# Patient Record
Sex: FEMALE | Race: WHITE | NOT HISPANIC OR LATINO
[De-identification: names, ages, dates, MRNs, and addresses within clinical notes are randomized per-mention and may not be internally consistent; named-entity substitution may affect disease eponyms.]

---

## 2019-06-06 ENCOUNTER — RESULT REVIEW (OUTPATIENT)
Age: 39
End: 2019-06-06

## 2020-01-15 PROBLEM — Z00.00 ENCOUNTER FOR PREVENTIVE HEALTH EXAMINATION: Status: ACTIVE | Noted: 2020-01-15

## 2020-01-17 ENCOUNTER — APPOINTMENT (OUTPATIENT)
Dept: ANTEPARTUM | Facility: CLINIC | Age: 40
End: 2020-01-17
Payer: COMMERCIAL

## 2020-01-17 ENCOUNTER — ASOB RESULT (OUTPATIENT)
Age: 40
End: 2020-01-17

## 2020-01-17 PROCEDURE — 76817 TRANSVAGINAL US OBSTETRIC: CPT

## 2020-01-17 PROCEDURE — 99241 OFFICE CONSULTATION NEW/ESTAB PATIENT 15 MIN: CPT | Mod: 25

## 2020-01-17 PROCEDURE — 76811 OB US DETAILED SNGL FETUS: CPT

## 2020-06-09 ENCOUNTER — TRANSCRIPTION ENCOUNTER (OUTPATIENT)
Age: 40
End: 2020-06-09

## 2020-06-10 ENCOUNTER — INPATIENT (INPATIENT)
Facility: HOSPITAL | Age: 40
LOS: 1 days | Discharge: HOME | End: 2020-06-12
Attending: OBSTETRICS & GYNECOLOGY | Admitting: OBSTETRICS & GYNECOLOGY
Payer: COMMERCIAL

## 2020-06-10 VITALS
RESPIRATION RATE: 15 BRPM | HEART RATE: 80 BPM | DIASTOLIC BLOOD PRESSURE: 62 MMHG | SYSTOLIC BLOOD PRESSURE: 118 MMHG | OXYGEN SATURATION: 99 %

## 2020-06-10 DIAGNOSIS — Z3A.00 WEEKS OF GESTATION OF PREGNANCY NOT SPECIFIED: ICD-10-CM

## 2020-06-10 DIAGNOSIS — O26.899 OTHER SPECIFIED PREGNANCY RELATED CONDITIONS, UNSPECIFIED TRIMESTER: ICD-10-CM

## 2020-06-10 DIAGNOSIS — Z34.80 ENCOUNTER FOR SUPERVISION OF OTHER NORMAL PREGNANCY, UNSPECIFIED TRIMESTER: ICD-10-CM

## 2020-06-10 LAB
BASOPHILS # BLD AUTO: 0.03 K/UL — SIGNIFICANT CHANGE UP (ref 0–0.2)
BASOPHILS NFR BLD AUTO: 0.2 % — SIGNIFICANT CHANGE UP (ref 0–2)
BLD GP AB SCN SERPL QL: NEGATIVE — SIGNIFICANT CHANGE UP
EOSINOPHIL # BLD AUTO: 0.01 K/UL — SIGNIFICANT CHANGE UP (ref 0–0.5)
EOSINOPHIL NFR BLD AUTO: 0.1 % — SIGNIFICANT CHANGE UP (ref 0–6)
HCT VFR BLD CALC: 36.5 % — SIGNIFICANT CHANGE UP (ref 34.5–45)
HGB BLD-MCNC: 12.4 G/DL — SIGNIFICANT CHANGE UP (ref 11.5–15.5)
IMM GRANULOCYTES NFR BLD AUTO: 0.4 % — SIGNIFICANT CHANGE UP (ref 0–1.5)
LYMPHOCYTES # BLD AUTO: 0.73 K/UL — LOW (ref 1–3.3)
LYMPHOCYTES # BLD AUTO: 6 % — LOW (ref 13–44)
MCHC RBC-ENTMCNC: 32.4 PG — SIGNIFICANT CHANGE UP (ref 27–34)
MCHC RBC-ENTMCNC: 34 GM/DL — SIGNIFICANT CHANGE UP (ref 32–36)
MCV RBC AUTO: 95.3 FL — SIGNIFICANT CHANGE UP (ref 80–100)
MONOCYTES # BLD AUTO: 0.61 K/UL — SIGNIFICANT CHANGE UP (ref 0–0.9)
MONOCYTES NFR BLD AUTO: 5 % — SIGNIFICANT CHANGE UP (ref 2–14)
NEUTROPHILS # BLD AUTO: 10.81 K/UL — HIGH (ref 1.8–7.4)
NEUTROPHILS NFR BLD AUTO: 88.3 % — HIGH (ref 43–77)
NRBC # BLD: 0 /100 WBCS — SIGNIFICANT CHANGE UP (ref 0–0)
PLATELET # BLD AUTO: 217 K/UL — SIGNIFICANT CHANGE UP (ref 150–400)
RBC # BLD: 3.83 M/UL — SIGNIFICANT CHANGE UP (ref 3.8–5.2)
RBC # FLD: 12.8 % — SIGNIFICANT CHANGE UP (ref 10.3–14.5)
RH IG SCN BLD-IMP: POSITIVE — SIGNIFICANT CHANGE UP
RH IG SCN BLD-IMP: POSITIVE — SIGNIFICANT CHANGE UP
SARS-COV-2 RNA SPEC QL NAA+PROBE: SIGNIFICANT CHANGE UP
T PALLIDUM AB TITR SER: NEGATIVE — SIGNIFICANT CHANGE UP
WBC # BLD: 12.24 K/UL — HIGH (ref 3.8–10.5)
WBC # FLD AUTO: 12.24 K/UL — HIGH (ref 3.8–10.5)

## 2020-06-10 RX ORDER — LANOLIN
1 OINTMENT (GRAM) TOPICAL EVERY 6 HOURS
Refills: 0 | Status: DISCONTINUED | OUTPATIENT
Start: 2020-06-10 | End: 2020-06-12

## 2020-06-10 RX ORDER — SODIUM CHLORIDE 9 MG/ML
1000 INJECTION, SOLUTION INTRAVENOUS
Refills: 0 | Status: DISCONTINUED | OUTPATIENT
Start: 2020-06-10 | End: 2020-06-10

## 2020-06-10 RX ORDER — OXYTOCIN 10 UNIT/ML
333.33 VIAL (ML) INJECTION
Qty: 20 | Refills: 0 | Status: DISCONTINUED | OUTPATIENT
Start: 2020-06-10 | End: 2020-06-12

## 2020-06-10 RX ORDER — OXYTOCIN 10 UNIT/ML
4 VIAL (ML) INJECTION
Qty: 30 | Refills: 0 | Status: DISCONTINUED | OUTPATIENT
Start: 2020-06-10 | End: 2020-06-12

## 2020-06-10 RX ORDER — MORPHINE SULFATE 50 MG/1
1.5 CAPSULE, EXTENDED RELEASE ORAL ONCE
Refills: 0 | Status: DISCONTINUED | OUTPATIENT
Start: 2020-06-10 | End: 2020-06-11

## 2020-06-10 RX ORDER — OXYCODONE HYDROCHLORIDE 5 MG/1
5 TABLET ORAL
Refills: 0 | Status: DISCONTINUED | OUTPATIENT
Start: 2020-06-10 | End: 2020-06-12

## 2020-06-10 RX ORDER — DIPHENHYDRAMINE HCL 50 MG
25 CAPSULE ORAL EVERY 6 HOURS
Refills: 0 | Status: DISCONTINUED | OUTPATIENT
Start: 2020-06-10 | End: 2020-06-12

## 2020-06-10 RX ORDER — SODIUM CHLORIDE 9 MG/ML
1000 INJECTION, SOLUTION INTRAVENOUS
Refills: 0 | Status: DISCONTINUED | OUTPATIENT
Start: 2020-06-10 | End: 2020-06-12

## 2020-06-10 RX ORDER — OXYCODONE HYDROCHLORIDE 5 MG/1
5 TABLET ORAL ONCE
Refills: 0 | Status: DISCONTINUED | OUTPATIENT
Start: 2020-06-10 | End: 2020-06-12

## 2020-06-10 RX ORDER — MAGNESIUM HYDROXIDE 400 MG/1
30 TABLET, CHEWABLE ORAL
Refills: 0 | Status: DISCONTINUED | OUTPATIENT
Start: 2020-06-10 | End: 2020-06-12

## 2020-06-10 RX ORDER — IBUPROFEN 200 MG
600 TABLET ORAL EVERY 6 HOURS
Refills: 0 | Status: COMPLETED | OUTPATIENT
Start: 2020-06-10 | End: 2021-05-09

## 2020-06-10 RX ORDER — TETANUS TOXOID, REDUCED DIPHTHERIA TOXOID AND ACELLULAR PERTUSSIS VACCINE, ADSORBED 5; 2.5; 8; 8; 2.5 [IU]/.5ML; [IU]/.5ML; UG/.5ML; UG/.5ML; UG/.5ML
0.5 SUSPENSION INTRAMUSCULAR ONCE
Refills: 0 | Status: DISCONTINUED | OUTPATIENT
Start: 2020-06-10 | End: 2020-06-12

## 2020-06-10 RX ORDER — CITRIC ACID/SODIUM CITRATE 300-500 MG
15 SOLUTION, ORAL ORAL EVERY 6 HOURS
Refills: 0 | Status: DISCONTINUED | OUTPATIENT
Start: 2020-06-10 | End: 2020-06-10

## 2020-06-10 RX ORDER — ACETAMINOPHEN 500 MG
1000 TABLET ORAL ONCE
Refills: 0 | Status: COMPLETED | OUTPATIENT
Start: 2020-06-10 | End: 2020-06-10

## 2020-06-10 RX ORDER — KETOROLAC TROMETHAMINE 30 MG/ML
30 SYRINGE (ML) INJECTION EVERY 6 HOURS
Refills: 0 | Status: DISCONTINUED | OUTPATIENT
Start: 2020-06-10 | End: 2020-06-11

## 2020-06-10 RX ORDER — ACETAMINOPHEN 500 MG
975 TABLET ORAL
Refills: 0 | Status: DISCONTINUED | OUTPATIENT
Start: 2020-06-10 | End: 2020-06-12

## 2020-06-10 RX ORDER — SIMETHICONE 80 MG/1
80 TABLET, CHEWABLE ORAL EVERY 4 HOURS
Refills: 0 | Status: DISCONTINUED | OUTPATIENT
Start: 2020-06-10 | End: 2020-06-12

## 2020-06-10 RX ORDER — HEPARIN SODIUM 5000 [USP'U]/ML
5000 INJECTION INTRAVENOUS; SUBCUTANEOUS EVERY 12 HOURS
Refills: 0 | Status: DISCONTINUED | OUTPATIENT
Start: 2020-06-10 | End: 2020-06-12

## 2020-06-10 RX ORDER — OXYCODONE HYDROCHLORIDE 5 MG/1
5 TABLET ORAL
Refills: 0 | Status: DISCONTINUED | OUTPATIENT
Start: 2020-06-10 | End: 2020-06-11

## 2020-06-10 RX ADMIN — Medication 400 MILLIGRAM(S): at 19:04

## 2020-06-10 RX ADMIN — Medication 15 MILLILITER(S): at 15:59

## 2020-06-10 RX ADMIN — Medication 4 MILLIUNIT(S)/MIN: at 11:55

## 2020-06-10 RX ADMIN — SODIUM CHLORIDE 125 MILLILITER(S): 9 INJECTION, SOLUTION INTRAVENOUS at 15:19

## 2020-06-10 RX ADMIN — Medication 30 MILLIGRAM(S): at 17:47

## 2020-06-10 RX ADMIN — Medication 1000 MILLIUNIT(S)/MIN: at 17:00

## 2020-06-10 NOTE — PRE-ANESTHESIA EVALUATION ADULT - NSANTHPMHFT_GEN_ALL_CORE
39F primigravid without significant PMHx. Allergies to penicillin, ceclor, biaxin. Denies Hx back problems, bleeding disorders.

## 2020-06-11 LAB
BASOPHILS # BLD AUTO: 0.04 K/UL — SIGNIFICANT CHANGE UP (ref 0–0.2)
BASOPHILS NFR BLD AUTO: 0.3 % — SIGNIFICANT CHANGE UP (ref 0–2)
EOSINOPHIL # BLD AUTO: 0.01 K/UL — SIGNIFICANT CHANGE UP (ref 0–0.5)
EOSINOPHIL NFR BLD AUTO: 0.1 % — SIGNIFICANT CHANGE UP (ref 0–6)
HCT VFR BLD CALC: 26.1 % — LOW (ref 34.5–45)
HGB BLD-MCNC: 8.7 G/DL — LOW (ref 11.5–15.5)
IMM GRANULOCYTES NFR BLD AUTO: 0.6 % — SIGNIFICANT CHANGE UP (ref 0–1.5)
LYMPHOCYTES # BLD AUTO: 0.92 K/UL — LOW (ref 1–3.3)
LYMPHOCYTES # BLD AUTO: 5.9 % — LOW (ref 13–44)
MCHC RBC-ENTMCNC: 32.8 PG — SIGNIFICANT CHANGE UP (ref 27–34)
MCHC RBC-ENTMCNC: 33.3 GM/DL — SIGNIFICANT CHANGE UP (ref 32–36)
MCV RBC AUTO: 98.5 FL — SIGNIFICANT CHANGE UP (ref 80–100)
MONOCYTES # BLD AUTO: 0.94 K/UL — HIGH (ref 0–0.9)
MONOCYTES NFR BLD AUTO: 6.1 % — SIGNIFICANT CHANGE UP (ref 2–14)
NEUTROPHILS # BLD AUTO: 13.5 K/UL — HIGH (ref 1.8–7.4)
NEUTROPHILS NFR BLD AUTO: 87 % — HIGH (ref 43–77)
NRBC # BLD: 0 /100 WBCS — SIGNIFICANT CHANGE UP (ref 0–0)
PLATELET # BLD AUTO: 186 K/UL — SIGNIFICANT CHANGE UP (ref 150–400)
RBC # BLD: 2.65 M/UL — LOW (ref 3.8–5.2)
RBC # FLD: 13 % — SIGNIFICANT CHANGE UP (ref 10.3–14.5)
WBC # BLD: 15.51 K/UL — HIGH (ref 3.8–10.5)
WBC # FLD AUTO: 15.51 K/UL — HIGH (ref 3.8–10.5)

## 2020-06-11 RX ORDER — FERROUS SULFATE 325(65) MG
325 TABLET ORAL
Refills: 0 | Status: DISCONTINUED | OUTPATIENT
Start: 2020-06-11 | End: 2020-06-12

## 2020-06-11 RX ORDER — SENNA PLUS 8.6 MG/1
1 TABLET ORAL DAILY
Refills: 0 | Status: DISCONTINUED | OUTPATIENT
Start: 2020-06-11 | End: 2020-06-12

## 2020-06-11 RX ORDER — IBUPROFEN 200 MG
600 TABLET ORAL EVERY 6 HOURS
Refills: 0 | Status: DISCONTINUED | OUTPATIENT
Start: 2020-06-11 | End: 2020-06-12

## 2020-06-11 RX ORDER — ASCORBIC ACID 60 MG
500 TABLET,CHEWABLE ORAL DAILY
Refills: 0 | Status: DISCONTINUED | OUTPATIENT
Start: 2020-06-11 | End: 2020-06-12

## 2020-06-11 RX ADMIN — HEPARIN SODIUM 5000 UNIT(S): 5000 INJECTION INTRAVENOUS; SUBCUTANEOUS at 11:49

## 2020-06-11 RX ADMIN — Medication 975 MILLIGRAM(S): at 21:36

## 2020-06-11 RX ADMIN — HEPARIN SODIUM 5000 UNIT(S): 5000 INJECTION INTRAVENOUS; SUBCUTANEOUS at 00:32

## 2020-06-11 RX ADMIN — Medication 500 MILLIGRAM(S): at 11:48

## 2020-06-11 RX ADMIN — Medication 30 MILLIGRAM(S): at 00:31

## 2020-06-11 RX ADMIN — Medication 30 MILLIGRAM(S): at 11:49

## 2020-06-11 RX ADMIN — Medication 600 MILLIGRAM(S): at 17:55

## 2020-06-11 RX ADMIN — Medication 975 MILLIGRAM(S): at 02:46

## 2020-06-11 RX ADMIN — Medication 975 MILLIGRAM(S): at 14:31

## 2020-06-11 RX ADMIN — Medication 30 MILLIGRAM(S): at 05:53

## 2020-06-11 RX ADMIN — SENNA PLUS 1 TABLET(S): 8.6 TABLET ORAL at 14:31

## 2020-06-11 RX ADMIN — Medication 325 MILLIGRAM(S): at 17:50

## 2020-06-11 RX ADMIN — Medication 975 MILLIGRAM(S): at 08:01

## 2020-06-11 NOTE — CHART NOTE - NSCHARTNOTEFT_GEN_A_CORE
PA Anemia NOTE     POD#1      Vital Signs Last 24 Hrs  T(C): 36.4 (2020 06:31), Max: 37.6 (10 Raoul 2020 19:55)  T(F): 97.6 (2020 06:31), Max: 99.7 (10 Raoul 2020 19:55)  HR: 60 (2020 06:31) (60 - 102)  BP: 109/72 (2020 06:31) (103/56 - 118/62)  BP(mean): 78 (10 Raoul 2020 19:55) (72 - 85)  RR: 18 (2020 06:31) (14 - 22)  SpO2: 98% (2020 06:31) (96% - 99%)               8.7    15.51 )-----------( 186      ( 11 @ 06:36 )             26.1                12.4   12.24 )-----------( 217      ( 10 @ 03:19 )             36.5     Assessment:  39 y.o. S/P   C/S with anemia due to acute blood loss-VSS/Asx-not requiring blood tranfusion for Iron Supplementation  Plan:  - Ferrous Sulfate, Colace, Vitamin C supplementation.  - Monitor for signs/symptoms of anemia.     MANJEET Burns

## 2020-06-11 NOTE — PROGRESS NOTE ADULT - ATTENDING COMMENTS
POD1 s/p 1'  section, doing well  -Routine postoperative care   -H/H appropriate   -Reg diet   -OLUI Peter DO

## 2020-06-12 ENCOUNTER — TRANSCRIPTION ENCOUNTER (OUTPATIENT)
Age: 40
End: 2020-06-12

## 2020-06-12 VITALS
HEART RATE: 58 BPM | OXYGEN SATURATION: 99 % | SYSTOLIC BLOOD PRESSURE: 113 MMHG | DIASTOLIC BLOOD PRESSURE: 76 MMHG | RESPIRATION RATE: 18 BRPM | TEMPERATURE: 98 F

## 2020-06-12 PROCEDURE — 86901 BLOOD TYPING SEROLOGIC RH(D): CPT

## 2020-06-12 PROCEDURE — 86780 TREPONEMA PALLIDUM: CPT

## 2020-06-12 PROCEDURE — 59050 FETAL MONITOR W/REPORT: CPT

## 2020-06-12 PROCEDURE — C1889: CPT

## 2020-06-12 PROCEDURE — 86900 BLOOD TYPING SEROLOGIC ABO: CPT

## 2020-06-12 PROCEDURE — 86850 RBC ANTIBODY SCREEN: CPT

## 2020-06-12 PROCEDURE — G0463: CPT

## 2020-06-12 PROCEDURE — 59025 FETAL NON-STRESS TEST: CPT

## 2020-06-12 PROCEDURE — 85027 COMPLETE CBC AUTOMATED: CPT

## 2020-06-12 RX ORDER — SENNA PLUS 8.6 MG/1
1 TABLET ORAL
Qty: 0 | Refills: 0 | DISCHARGE
Start: 2020-06-12

## 2020-06-12 RX ORDER — ACETAMINOPHEN 500 MG
3 TABLET ORAL
Qty: 0 | Refills: 0 | DISCHARGE
Start: 2020-06-12

## 2020-06-12 RX ORDER — IBUPROFEN 200 MG
1 TABLET ORAL
Qty: 0 | Refills: 0 | DISCHARGE
Start: 2020-06-12

## 2020-06-12 RX ORDER — FERROUS SULFATE 325(65) MG
1 TABLET ORAL
Qty: 0 | Refills: 0 | DISCHARGE
Start: 2020-06-12

## 2020-06-12 RX ORDER — ACETAMINOPHEN 500 MG
1 TABLET ORAL
Qty: 0 | Refills: 0 | DISCHARGE
Start: 2020-06-12

## 2020-06-12 RX ADMIN — Medication 975 MILLIGRAM(S): at 03:17

## 2020-06-12 RX ADMIN — SENNA PLUS 1 TABLET(S): 8.6 TABLET ORAL at 11:29

## 2020-06-12 RX ADMIN — HEPARIN SODIUM 5000 UNIT(S): 5000 INJECTION INTRAVENOUS; SUBCUTANEOUS at 00:44

## 2020-06-12 RX ADMIN — Medication 600 MILLIGRAM(S): at 00:44

## 2020-06-12 RX ADMIN — Medication 325 MILLIGRAM(S): at 05:55

## 2020-06-12 RX ADMIN — Medication 600 MILLIGRAM(S): at 11:29

## 2020-06-12 RX ADMIN — HEPARIN SODIUM 5000 UNIT(S): 5000 INJECTION INTRAVENOUS; SUBCUTANEOUS at 11:31

## 2020-06-12 RX ADMIN — Medication 975 MILLIGRAM(S): at 14:37

## 2020-06-12 RX ADMIN — Medication 600 MILLIGRAM(S): at 05:55

## 2020-06-12 RX ADMIN — Medication 500 MILLIGRAM(S): at 11:29

## 2020-06-12 RX ADMIN — Medication 975 MILLIGRAM(S): at 09:40

## 2020-06-12 NOTE — PROGRESS NOTE ADULT - ASSESSMENT
A/P: 40yo POD#2 s/p LTCS.  Patient is stable and doing well post-operatively.
A/P: 38yo POD#1 s/p LTCS. Patient is stable and doing well post-operatively.    - Continue regular diet.  - Increase ambulation.  - Continue motrin, tylenol, oxycodone PRN for pain control.   - AM CBC 12.4/36.5-->8.7/26.1, vital signs stable at this time, continue to monitor    Ken PGY1

## 2020-06-12 NOTE — DISCHARGE NOTE OB - HOSPITAL COURSE
Pt had a repeat  delivery at term. She had a female infant. She is anemic from surgical blood loss but asymptomatic. She will take oral iron

## 2020-06-12 NOTE — PROGRESS NOTE ADULT - SUBJECTIVE AND OBJECTIVE BOX
OB Progress Note: LTCS, POD#2    S: 38yo POD#2 s/p LTCS. Pain well controlled, tolerating regular diet, passing faltus, voiding spontaneously, ambulating without difficulty. Denies heavy vaginal bleeding, CP/SOB, lightheadedness/dizziness, nausea/vomiting,     O:  Vitals:  Vital Signs Last 24 Hrs  T(C): 36.6 (11 Jun 2020 23:00), Max: 37.1 (11 Jun 2020 13:15)  T(F): 97.9 (11 Jun 2020 23:00), Max: 98.7 (11 Jun 2020 13:15)  HR: 64 (11 Jun 2020 23:00) (59 - 64)  BP: 102/63 (11 Jun 2020 23:00) (98/61 - 117/71)  BP(mean): --  RR: 18 (11 Jun 2020 23:00) (16 - 18)  SpO2: 99% (11 Jun 2020 23:00) (98% - 100%)    MEDICATIONS  (STANDING):  acetaminophen   Tablet .. 975 milliGRAM(s) Oral <User Schedule>  ascorbic acid 500 milliGRAM(s) Oral daily  diphtheria/tetanus/pertussis (acellular) Vaccine (ADAcel) 0.5 milliLiter(s) IntraMuscular once  ferrous    sulfate 325 milliGRAM(s) Oral two times a day  heparin   Injectable 5000 Unit(s) SubCutaneous every 12 hours  ibuprofen  Tablet. 600 milliGRAM(s) Oral every 6 hours  lactated ringers. 1000 milliLiter(s) (125 mL/Hr) IV Continuous <Continuous>  oxytocin Infusion 333.333 milliUNIT(s)/Min (1000 mL/Hr) IV Continuous <Continuous>  oxytocin Infusion 333.333 milliUNIT(s)/Min (1000 mL/Hr) IV Continuous <Continuous>  oxytocin Infusion 4 milliUNIT(s)/Min (4 mL/Hr) IV Continuous <Continuous>  senna 1 Tablet(s) Oral daily      MEDICATIONS  (PRN):  diphenhydrAMINE 25 milliGRAM(s) Oral every 6 hours PRN Itching  lanolin Ointment 1 Application(s) Topical every 6 hours PRN Sore Nipples  magnesium hydroxide Suspension 30 milliLiter(s) Oral two times a day PRN Constipation  oxyCODONE    IR 5 milliGRAM(s) Oral every 3 hours PRN Moderate to Severe Pain (4-10)  oxyCODONE    IR 5 milliGRAM(s) Oral once PRN Moderate to Severe Pain (4-10)  simethicone 80 milliGRAM(s) Chew every 4 hours PRN Gas      Labs:  Blood type: O Positive  Rubella IgG: RPR: Negative                          8.7<L>   15.51<H> >-----------< 186    ( 06-11 @ 06:36 )             26.1<L>                        12.4   12.24<H> >-----------< 217    ( 06-10 @ 03:19 )             36.5                  PE:  General: NAD  Abdomen: Soft, appropriately tender, Fundus firm incision c/d/i.  VE: No heavy vaginal bleeding  Extremities: No erythema, no pitting edema
Day 1 of Anesthesia Pain Management Service    SUBJECTIVE:  Pain Scale Score:          [X] Refer to charted pain scores    THERAPY: Received PF Epidurall morphine as above    OBJECTIVE:    Sedation:        	[X] Alert	[ ] Drowsy	[ ] Arousable      [ ] Asleep       [ ] Unresponsive    Side Effects:	[X] None	[ ] Nausea	[ ] Vomiting         [ ] Pruritus  		[ ] Weakness            [ ] Numbness	          [ ] Other:    ASSESSMENT/ PLAN  [X] Patient transitioned to prn analgesics  [X] Pain management per primary service, pain service to sign off   [X]Documentation and Verification of current medications
Day 1 of Anesthesia Pain Management Service    SUBJECTIVE: Doing ok  Pain Scale Score:          [X] Refer to charted pain scores    THERAPY:  s/p   1.5  mg PF epidural morphine on 6/10/2020      MEDICATIONS  (STANDING):  acetaminophen   Tablet .. 975 milliGRAM(s) Oral <User Schedule>  ascorbic acid 500 milliGRAM(s) Oral daily  diphtheria/tetanus/pertussis (acellular) Vaccine (ADAcel) 0.5 milliLiter(s) IntraMuscular once  ferrous    sulfate 325 milliGRAM(s) Oral two times a day  heparin   Injectable 5000 Unit(s) SubCutaneous every 12 hours  ibuprofen  Tablet. 600 milliGRAM(s) Oral every 6 hours  ketorolac   Injectable 30 milliGRAM(s) IV Push every 6 hours  lactated ringers. 1000 milliLiter(s) (125 mL/Hr) IV Continuous <Continuous>  morphine PF Epidural 1.5 milliGRAM(s) Epidural once  oxytocin Infusion 333.333 milliUNIT(s)/Min (1000 mL/Hr) IV Continuous <Continuous>  oxytocin Infusion 333.333 milliUNIT(s)/Min (1000 mL/Hr) IV Continuous <Continuous>  oxytocin Infusion 4 milliUNIT(s)/Min (4 mL/Hr) IV Continuous <Continuous>  senna 1 Tablet(s) Oral daily    MEDICATIONS  (PRN):  diphenhydrAMINE 25 milliGRAM(s) Oral every 6 hours PRN Itching  lanolin Ointment 1 Application(s) Topical every 6 hours PRN Sore Nipples  magnesium hydroxide Suspension 30 milliLiter(s) Oral two times a day PRN Constipation  oxyCODONE    IR 5 milliGRAM(s) Oral every 3 hours PRN Mild Pain (1 - 3)  oxyCODONE    IR 5 milliGRAM(s) Oral every 3 hours PRN Moderate to Severe Pain (4-10)  oxyCODONE    IR 5 milliGRAM(s) Oral once PRN Moderate to Severe Pain (4-10)  simethicone 80 milliGRAM(s) Chew every 4 hours PRN Gas      OBJECTIVE:    Sedation:        	[X] Alert	 [ ] Drowsy	[ ] Arousable      [ ] Asleep       [ ] Unresponsive    Side Effects:	[X] None 	[ ] Nausea	[ ] Vomiting         [ ] Pruritus  		[ ] Weakness            [ ] Numbness	          [ ] Other:    Vital Signs Last 24 Hrs  T(C): 36.4 (11 Jun 2020 06:31), Max: 37.6 (10 Raoul 2020 19:55)  T(F): 97.6 (11 Jun 2020 06:31), Max: 99.7 (10 Raoul 2020 19:55)  HR: 60 (11 Jun 2020 06:31) (60 - 102)  BP: 109/72 (11 Jun 2020 06:31) (103/56 - 118/62)  BP(mean): 78 (10 Raoul 2020 19:55) (72 - 85)  RR: 18 (11 Jun 2020 06:31) (14 - 22)  SpO2: 98% (11 Jun 2020 06:31) (96% - 99%)    ASSESSMENT/ PLAN  [X] Patient transitioned to prn analgesics  [X] Pain management per primary service, pain service to sign off   [X]Documentation and Verification of current medications
OB Progress Note:  Delivery, POD#1    S: 38yo POD#1 s/p LTCS . Her pain is well controlled. She is tolerating a regular diet and passing flatus. Denies N/V. Denies CP/SOB/lightheadedness/dizziness.   She is ambulating without difficulty.   Voiding spontaneously.     O:   Vital Signs Last 24 Hrs  T(C): 36.4 (2020 06:31), Max: 37.6 (10 Raoul 2020 19:55)  T(F): 97.6 (2020 06:31), Max: 99.7 (10 Raoul 2020 19:55)  HR: 60 (2020 06:31) (60 - 102)  BP: 109/72 (2020 06:31) (103/56 - 118/62)  BP(mean): 78 (10 Raoul 2020 19:55) (72 - 85)  RR: 18 (2020 06:31) (14 - 22)  SpO2: 98% (2020 06:31) (96% - 99%)    Labs:  Blood type: O Positive  Rubella IgG: RPR: Negative                          8.7<L>   15.51<H> >-----------< 186    (  @ 06:36 )             26.1<L>                        12.4   12.24<H> >-----------< 217    ( 06-10 @ 03:19 )             36.5                  PE:  General: NAD  Abdomen: Mildly distended, appropriately tender, incision c/d/i.  Extremities: No erythema, no pitting edema
Postpartum Note,  Section   ATTENDING NOTE Post-operative day 2    Subjective:  The patient feels well.  She is ambulating.   She is tolerating regular diet.  She denies nausea and vomiting.  She is voiding.  Her pain is controlled.  She reports normal postpartum bleeding    Physical exam:    Vital Signs Last 24 Hrs  T(C): 36.6 (2020 09:09), Max: 37.1 (2020 13:15)  T(F): 97.9 (2020 09:09), Max: 98.7 (2020 13:15)  HR: 58 (2020 09:09) (58 - 66)  BP: 113/76 (2020 09:09) (99/68 - 117/71)  BP(mean): --  RR: 18 (2020 09:09) (18 - 18)  SpO2: 99% (2020 09:09) (98% - 100%)    Gen: NAD  Breast: Soft, nontender, not engorged.  Abdomen: Soft, nontender, no distension , firm uterine fundus at umbilicus.  Incision: Clean, dry, and intact  Pelvic: Normal lochia noted  Ext: No calf tenderness    LABS:                        8.7    15.51 )-----------( 186      ( 2020 06:36 )             26.1                   Allergies    Biaxin (Hives)  Ceclor (Hives)  penicillin (Hives)    Intolerances      MEDICATIONS  (STANDING):  acetaminophen   Tablet .. 975 milliGRAM(s) Oral <User Schedule>  ascorbic acid 500 milliGRAM(s) Oral daily  diphtheria/tetanus/pertussis (acellular) Vaccine (ADAcel) 0.5 milliLiter(s) IntraMuscular once  ferrous    sulfate 325 milliGRAM(s) Oral two times a day  heparin   Injectable 5000 Unit(s) SubCutaneous every 12 hours  ibuprofen  Tablet. 600 milliGRAM(s) Oral every 6 hours  lactated ringers. 1000 milliLiter(s) (125 mL/Hr) IV Continuous <Continuous>  oxytocin Infusion 333.333 milliUNIT(s)/Min (1000 mL/Hr) IV Continuous <Continuous>  oxytocin Infusion 333.333 milliUNIT(s)/Min (1000 mL/Hr) IV Continuous <Continuous>  oxytocin Infusion 4 milliUNIT(s)/Min (4 mL/Hr) IV Continuous <Continuous>  senna 1 Tablet(s) Oral daily    MEDICATIONS  (PRN):  diphenhydrAMINE 25 milliGRAM(s) Oral every 6 hours PRN Itching  lanolin Ointment 1 Application(s) Topical every 6 hours PRN Sore Nipples  magnesium hydroxide Suspension 30 milliLiter(s) Oral two times a day PRN Constipation  oxyCODONE    IR 5 milliGRAM(s) Oral every 3 hours PRN Moderate to Severe Pain (4-10)  oxyCODONE    IR 5 milliGRAM(s) Oral once PRN Moderate to Severe Pain (4-10)  simethicone 80 milliGRAM(s) Chew every 4 hours PRN Gas        Assessment and Plan      Office [phone]  Dr. Mendez

## 2020-06-12 NOTE — DISCHARGE NOTE OB - PLAN OF CARE
recover from surgery POD 2 s/p  stable for discharge. Asymptomatic anemia from blood loss will treat with oral iron

## 2020-06-12 NOTE — PROGRESS NOTE ADULT - PROBLEM SELECTOR PLAN 1
POD # 2  s/p  section. Stable.  Encourage ambulation  Analgesia prn  Regular diet as tolerated  D/C home

## 2020-06-12 NOTE — DISCHARGE NOTE OB - PATIENT PORTAL LINK FT
You can access the FollowMyHealth Patient Portal offered by [facility] by registering at the following website: [email or web address]. By joining [facility]’s FollowMyHealth portal, you will also be able to view your health information using other applications (apps) compatible with our system.

## 2020-06-12 NOTE — DISCHARGE NOTE OB - MEDICATION SUMMARY - MEDICATIONS TO TAKE
I will START or STAY ON the medications listed below when I get home from the hospital:    acetaminophen 325 mg oral tablet  -- 3 tab(s) by mouth   -- Indication: For for pain    ibuprofen 600 mg oral tablet  -- 1 tab(s) by mouth every 6 hours  -- Indication: For for pain    ferrous sulfate 200 mg (65 mg elemental iron) oral tablet  -- 1 tab(s) by mouth once a day  -- Indication: For anemia    Prenatal Multivitamins oral tablet  -- Indication: For wellness    senna oral tablet  -- 1 tab(s) by mouth once a day  -- Indication: For Constipation

## 2020-06-12 NOTE — DISCHARGE NOTE OB - CARE PLAN
Principal Discharge DX:	 delivery delivered  Goal:	recover from surgery  Assessment and plan of treatment:	POD 2 s/p  stable for discharge. Asymptomatic anemia from blood loss will treat with oral iron

## 2020-06-12 NOTE — PROGRESS NOTE ADULT - PROBLEM SELECTOR PLAN 1
- Continue regular diet  - Increase ambulation  - Continue motrin, tylenol, oxycodone PRN for pain control.     Re Matthews, PGY-1

## 2020-06-12 NOTE — DISCHARGE NOTE OB - CARE PROVIDER_API CALL
Dinora Cramer  OBSTETRICS AND GYNECOLOGY  [address]  [place] [address]  Phone: [phone]  Fax: [phone]  Follow Up Time:

## 2020-07-20 ENCOUNTER — RESULT REVIEW (OUTPATIENT)
Age: 40
End: 2020-07-20

## 2020-12-22 ENCOUNTER — TRANSCRIPTION ENCOUNTER (OUTPATIENT)
Age: 40
End: 2020-12-22

## 2021-06-19 NOTE — PRE-ANESTHESIA EVALUATION ADULT - NSPREOPDXFT_GEN_ALL_CORE
intrauterine pregnancy Supine, bilateral LEs: glute sets, quad sets, ankle pumps (all through full range, 1 set, 10 reps). Patient tolerated therex well. Educated patient to perform therex regimen 3-5x/day, patient verbalized understanding; written handout provided.

## 2021-07-19 ENCOUNTER — RESULT REVIEW (OUTPATIENT)
Age: 41
End: 2021-07-19

## 2021-12-03 ENCOUNTER — ASOB RESULT (OUTPATIENT)
Age: 41
End: 2021-12-03

## 2021-12-03 ENCOUNTER — APPOINTMENT (OUTPATIENT)
Dept: ANTEPARTUM | Facility: CLINIC | Age: 41
End: 2021-12-03
Payer: COMMERCIAL

## 2021-12-03 PROCEDURE — 76811 OB US DETAILED SNGL FETUS: CPT

## 2022-02-21 ENCOUNTER — OUTPATIENT (OUTPATIENT)
Dept: INPATIENT UNIT | Facility: HOSPITAL | Age: 42
LOS: 1 days | End: 2022-02-21
Payer: COMMERCIAL

## 2022-02-21 VITALS
TEMPERATURE: 98 F | RESPIRATION RATE: 16 BRPM | SYSTOLIC BLOOD PRESSURE: 102 MMHG | HEART RATE: 75 BPM | DIASTOLIC BLOOD PRESSURE: 55 MMHG

## 2022-02-21 VITALS
SYSTOLIC BLOOD PRESSURE: 116 MMHG | DIASTOLIC BLOOD PRESSURE: 72 MMHG | HEART RATE: 91 BPM | RESPIRATION RATE: 16 BRPM | OXYGEN SATURATION: 98 % | TEMPERATURE: 98 F

## 2022-02-21 DIAGNOSIS — Z3A.00 WEEKS OF GESTATION OF PREGNANCY NOT SPECIFIED: ICD-10-CM

## 2022-02-21 DIAGNOSIS — O26.899 OTHER SPECIFIED PREGNANCY RELATED CONDITIONS, UNSPECIFIED TRIMESTER: ICD-10-CM

## 2022-02-21 LAB
ALBUMIN SERPL ELPH-MCNC: 3.6 G/DL — SIGNIFICANT CHANGE UP (ref 3.3–5)
ALP SERPL-CCNC: 105 U/L — SIGNIFICANT CHANGE UP (ref 40–120)
ALT FLD-CCNC: 10 U/L — SIGNIFICANT CHANGE UP (ref 10–45)
ANION GAP SERPL CALC-SCNC: 11 MMOL/L — SIGNIFICANT CHANGE UP (ref 5–17)
APPEARANCE UR: CLEAR — SIGNIFICANT CHANGE UP
APTT BLD: 29.3 SEC — SIGNIFICANT CHANGE UP (ref 27.5–35.5)
AST SERPL-CCNC: 21 U/L — SIGNIFICANT CHANGE UP (ref 10–40)
BASOPHILS # BLD AUTO: 0.03 K/UL — SIGNIFICANT CHANGE UP (ref 0–0.2)
BASOPHILS NFR BLD AUTO: 0.3 % — SIGNIFICANT CHANGE UP (ref 0–2)
BILIRUB SERPL-MCNC: 0.2 MG/DL — SIGNIFICANT CHANGE UP (ref 0.2–1.2)
BILIRUB UR-MCNC: NEGATIVE — SIGNIFICANT CHANGE UP
BUN SERPL-MCNC: 7 MG/DL — SIGNIFICANT CHANGE UP (ref 7–23)
CALCIUM SERPL-MCNC: 8.7 MG/DL — SIGNIFICANT CHANGE UP (ref 8.4–10.5)
CHLORIDE SERPL-SCNC: 104 MMOL/L — SIGNIFICANT CHANGE UP (ref 96–108)
CO2 SERPL-SCNC: 19 MMOL/L — LOW (ref 22–31)
COLOR SPEC: COLORLESS — SIGNIFICANT CHANGE UP
CREAT ?TM UR-MCNC: 25 MG/DL — SIGNIFICANT CHANGE UP
CREAT SERPL-MCNC: 0.45 MG/DL — LOW (ref 0.5–1.3)
DIFF PNL FLD: NEGATIVE — SIGNIFICANT CHANGE UP
EOSINOPHIL # BLD AUTO: 0.05 K/UL — SIGNIFICANT CHANGE UP (ref 0–0.5)
EOSINOPHIL NFR BLD AUTO: 0.5 % — SIGNIFICANT CHANGE UP (ref 0–6)
FIBRINOGEN PPP-MCNC: 604 MG/DL — HIGH (ref 290–520)
GLUCOSE SERPL-MCNC: 91 MG/DL — SIGNIFICANT CHANGE UP (ref 70–99)
GLUCOSE UR QL: NEGATIVE — SIGNIFICANT CHANGE UP
HCT VFR BLD CALC: 35.1 % — SIGNIFICANT CHANGE UP (ref 34.5–45)
HGB BLD-MCNC: 11.6 G/DL — SIGNIFICANT CHANGE UP (ref 11.5–15.5)
IMM GRANULOCYTES NFR BLD AUTO: 0.3 % — SIGNIFICANT CHANGE UP (ref 0–1.5)
INR BLD: 0.96 RATIO — SIGNIFICANT CHANGE UP (ref 0.88–1.16)
KETONES UR-MCNC: NEGATIVE — SIGNIFICANT CHANGE UP
LDH SERPL L TO P-CCNC: 167 U/L — SIGNIFICANT CHANGE UP (ref 50–242)
LEUKOCYTE ESTERASE UR-ACNC: NEGATIVE — SIGNIFICANT CHANGE UP
LYMPHOCYTES # BLD AUTO: 1.08 K/UL — SIGNIFICANT CHANGE UP (ref 1–3.3)
LYMPHOCYTES # BLD AUTO: 10.6 % — LOW (ref 13–44)
MCHC RBC-ENTMCNC: 31.2 PG — SIGNIFICANT CHANGE UP (ref 27–34)
MCHC RBC-ENTMCNC: 33 GM/DL — SIGNIFICANT CHANGE UP (ref 32–36)
MCV RBC AUTO: 94.4 FL — SIGNIFICANT CHANGE UP (ref 80–100)
MONOCYTES # BLD AUTO: 0.6 K/UL — SIGNIFICANT CHANGE UP (ref 0–0.9)
MONOCYTES NFR BLD AUTO: 5.9 % — SIGNIFICANT CHANGE UP (ref 2–14)
NEUTROPHILS # BLD AUTO: 8.37 K/UL — HIGH (ref 1.8–7.4)
NEUTROPHILS NFR BLD AUTO: 82.4 % — HIGH (ref 43–77)
NITRITE UR-MCNC: NEGATIVE — SIGNIFICANT CHANGE UP
NRBC # BLD: 0 /100 WBCS — SIGNIFICANT CHANGE UP (ref 0–0)
PH UR: 6.5 — SIGNIFICANT CHANGE UP (ref 5–8)
PLATELET # BLD AUTO: 267 K/UL — SIGNIFICANT CHANGE UP (ref 150–400)
POTASSIUM SERPL-MCNC: 3.6 MMOL/L — SIGNIFICANT CHANGE UP (ref 3.5–5.3)
POTASSIUM SERPL-SCNC: 3.6 MMOL/L — SIGNIFICANT CHANGE UP (ref 3.5–5.3)
PROT ?TM UR-MCNC: <4 MG/DL — SIGNIFICANT CHANGE UP (ref 0–12)
PROT SERPL-MCNC: 6.4 G/DL — SIGNIFICANT CHANGE UP (ref 6–8.3)
PROT UR-MCNC: NEGATIVE — SIGNIFICANT CHANGE UP
PROT/CREAT UR-RTO: <0.2 RATIO — SIGNIFICANT CHANGE UP (ref 0–0.2)
PROTHROM AB SERPL-ACNC: 11.5 SEC — SIGNIFICANT CHANGE UP (ref 10.6–13.6)
RBC # BLD: 3.72 M/UL — LOW (ref 3.8–5.2)
RBC # FLD: 12.3 % — SIGNIFICANT CHANGE UP (ref 10.3–14.5)
SODIUM SERPL-SCNC: 134 MMOL/L — LOW (ref 135–145)
SP GR SPEC: 1 — LOW (ref 1.01–1.02)
URATE SERPL-MCNC: 2.8 MG/DL — SIGNIFICANT CHANGE UP (ref 2.5–7)
UROBILINOGEN FLD QL: NEGATIVE — SIGNIFICANT CHANGE UP
WBC # BLD: 10.16 K/UL — SIGNIFICANT CHANGE UP (ref 3.8–10.5)
WBC # FLD AUTO: 10.16 K/UL — SIGNIFICANT CHANGE UP (ref 3.8–10.5)

## 2022-02-21 PROCEDURE — 85610 PROTHROMBIN TIME: CPT

## 2022-02-21 PROCEDURE — 81003 URINALYSIS AUTO W/O SCOPE: CPT

## 2022-02-21 PROCEDURE — 85730 THROMBOPLASTIN TIME PARTIAL: CPT

## 2022-02-21 PROCEDURE — 80053 COMPREHEN METABOLIC PANEL: CPT

## 2022-02-21 PROCEDURE — 36415 COLL VENOUS BLD VENIPUNCTURE: CPT

## 2022-02-21 PROCEDURE — 85384 FIBRINOGEN ACTIVITY: CPT

## 2022-02-21 PROCEDURE — 82570 ASSAY OF URINE CREATININE: CPT

## 2022-02-21 PROCEDURE — 84550 ASSAY OF BLOOD/URIC ACID: CPT

## 2022-02-21 PROCEDURE — 59025 FETAL NON-STRESS TEST: CPT

## 2022-02-21 PROCEDURE — 83615 LACTATE (LD) (LDH) ENZYME: CPT

## 2022-02-21 PROCEDURE — G0463: CPT

## 2022-02-21 PROCEDURE — 84156 ASSAY OF PROTEIN URINE: CPT

## 2022-02-21 PROCEDURE — 85025 COMPLETE CBC W/AUTO DIFF WBC: CPT

## 2022-02-21 RX ORDER — DIPHENHYDRAMINE HCL 50 MG
25 CAPSULE ORAL ONCE
Refills: 0 | Status: COMPLETED | OUTPATIENT
Start: 2022-02-21 | End: 2022-02-21

## 2022-02-21 RX ORDER — SODIUM CHLORIDE 9 MG/ML
1000 INJECTION, SOLUTION INTRAVENOUS ONCE
Refills: 0 | Status: DISCONTINUED | OUTPATIENT
Start: 2022-02-21 | End: 2022-03-08

## 2022-02-21 RX ORDER — METOCLOPRAMIDE HCL 10 MG
10 TABLET ORAL ONCE
Refills: 0 | Status: COMPLETED | OUTPATIENT
Start: 2022-02-21 | End: 2022-02-21

## 2022-02-21 RX ADMIN — Medication 25 MILLIGRAM(S): at 21:32

## 2022-02-21 RX ADMIN — Medication 10 MILLIGRAM(S): at 21:31

## 2022-02-21 NOTE — OB RN TRIAGE NOTE - FALL HARM RISK - UNIVERSAL INTERVENTIONS
Bed in lowest position, wheels locked, appropriate side rails in place/Call bell, personal items and telephone in reach/Instruct patient to call for assistance before getting out of bed or chair/Non-slip footwear when patient is out of bed/[place] to call system/Physically safe environment - no spills, clutter or unnecessary equipment/Purposeful Proactive Rounding/Room/bathroom lighting operational, light cord in reach

## 2022-02-21 NOTE — OB PROVIDER TRIAGE NOTE - HISTORY OF PRESENT ILLNESS
R3 Triage Note    Subjective  HPI: 41yoF  @ 31w2d gestational age presents for _. +FM. -LOF. -CTXs. -VB. Pt denies any other concerns.    – PNC: Denies prenatal issues. GBS _.  EFW _g by sono.  – OBHx:   – GynHx: denies  – PMH: denies  – PSH: denies  – Psych: denies   – Social: denies   – Meds: PNV   – Allergies: NKDA  – Will accept blood transfusions? Yes    Objective  – VS  T(C): 36.8 (22 @ 20:27)  HR: 73 (22 @ 22:24)  BP: 95/59 (22 @ 22:22)  RR: 16 (22 @ 20:27)  SpO2: 97% (22 @ 22:24)  – PE:   CV: RRR  Pulm: breathing comfortably on RA  Abd: gravid, nontender  Extr: moving all extremities with ease  – FS:   – Spec: pooling, nitrazine, ferning, bleeding,  (lesions if patient with HSV2 history)  – VE: //  – FHT: baseline 1, mod variability, +accels, -decels  – [place]: qmin  – EFW: _g by sono  – Sono: vertex    Assessment  yoF GP gestational age presents for _.     Plan  1. Admit to LND. Routine Labs. IVF.  2. Expectant management/IOL w/  3. Fetus: cat 1 tracing. VTX. EFW _g by sono. Continuous EFM. Sono. No concerns.  4. Prenatal issues: none  5. GBS _  6. Pain: IV pain meds/epidural PRN      Plan per attending physician, Dr. Surinder Monk, PGY2 R3 Triage Note    Subjective  HPI: 41yoF  @ 31w2d presents with unresolving migraines with aura x 3 in one day today. Pt reports history of such prior to pregnancy, however worsening since beginning of third trimester. Had not taken any pain medication today until calling Dr. Martin, then took 1000mg Tylenol PO at 545pm. States it took edge off, now feels more like rebound headache. Aura is around eyes and headache mainly frontal and behind eyes. +FM. -LOF. -CTXs. -VB. Pt denies any other concerns.    – PNC: Denies prenatal issues.   – OBHx: pLTCS  arrest of descent   – GynHx: s/p LEEP   – PMH: denies  – PSH: denies  – Psych: denies   – Social: denies   – Meds: PNV   – Allergies: NKDA  – Will accept blood transfusions? Yes

## 2022-02-21 NOTE — OB PROVIDER TRIAGE NOTE - NSOBPROVIDERNOTE_OBGYN_ALL_OB_FT
Assessment  42 yo  @ 31w2d w/ h/o migraines, now with 3xaura with migraine today, improved with tylenol. Patient sent in to evaluate for other causes of migraine, ie HELLP/PEC.     Plan  - f/u HELLP labs  - IV hydration  - Reglan and Benadryl  - Cold pack for head, lights off  - Will readdress once labs received       Plan per attending physician, Dr. Veronica Monk, PGY3

## 2022-02-21 NOTE — OB PROVIDER TRIAGE NOTE - NSHPPHYSICALEXAM_GEN_ALL_CORE
Objective  – VS  T(C): 36.8 (02-21-22 @ 20:27)  HR: 73 (02-21-22 @ 22:24)  BP: 95/59 (02-21-22 @ 22:22)  RR: 16 (02-21-22 @ 20:27)  SpO2: 97% (02-21-22 @ 22:24)    – PE:   CV: RRR  Pulm: breathing comfortably on RA  Abd: gravid, nontender  Extr: moving all extremities with ease  – FHT: baseline 140, mod variability, +accels, -decels  – [place]: none

## 2022-03-25 ENCOUNTER — OUTPATIENT (OUTPATIENT)
Dept: OUTPATIENT SERVICES | Facility: HOSPITAL | Age: 42
LOS: 1 days | End: 2022-03-25
Payer: COMMERCIAL

## 2022-03-25 VITALS — SYSTOLIC BLOOD PRESSURE: 90 MMHG | DIASTOLIC BLOOD PRESSURE: 63 MMHG | HEART RATE: 108 BPM

## 2022-03-25 VITALS — SYSTOLIC BLOOD PRESSURE: 106 MMHG | DIASTOLIC BLOOD PRESSURE: 62 MMHG | HEART RATE: 93 BPM

## 2022-03-25 DIAGNOSIS — Z3A.00 WEEKS OF GESTATION OF PREGNANCY NOT SPECIFIED: ICD-10-CM

## 2022-03-25 DIAGNOSIS — O26.899 OTHER SPECIFIED PREGNANCY RELATED CONDITIONS, UNSPECIFIED TRIMESTER: ICD-10-CM

## 2022-03-25 PROCEDURE — G0463: CPT

## 2022-03-25 PROCEDURE — 59025 FETAL NON-STRESS TEST: CPT

## 2022-03-25 RX ORDER — SODIUM CHLORIDE 9 MG/ML
1000 INJECTION, SOLUTION INTRAVENOUS
Refills: 0 | Status: DISCONTINUED | OUTPATIENT
Start: 2022-03-25 | End: 2022-04-08

## 2022-03-25 RX ORDER — ONDANSETRON 8 MG/1
4 TABLET, FILM COATED ORAL ONCE
Refills: 0 | Status: COMPLETED | OUTPATIENT
Start: 2022-03-25 | End: 2022-03-25

## 2022-03-25 RX ORDER — METOCLOPRAMIDE HCL 10 MG
10 TABLET ORAL ONCE
Refills: 0 | Status: DISCONTINUED | OUTPATIENT
Start: 2022-03-25 | End: 2022-04-08

## 2022-03-25 RX ORDER — ONDANSETRON 8 MG/1
1 TABLET, FILM COATED ORAL
Qty: 6 | Refills: 0
Start: 2022-03-25

## 2022-03-25 RX ADMIN — ONDANSETRON 4 MILLIGRAM(S): 8 TABLET, FILM COATED ORAL at 09:59

## 2022-03-25 RX ADMIN — ONDANSETRON 4 MILLIGRAM(S): 8 TABLET, FILM COATED ORAL at 05:49

## 2022-03-25 RX ADMIN — SODIUM CHLORIDE 125 MILLILITER(S): 9 INJECTION, SOLUTION INTRAVENOUS at 05:49

## 2022-03-25 NOTE — OB PROVIDER TRIAGE NOTE - NSOBPROVIDERNOTE_OBGYN_ALL_OB_FT
Assessment  41y  at 35w6d presents for nausea and vomiting, likely mild viral illness.     Plan  - IV Fluids  - Zofran  - Continue to monitor  - NST reactive and reassuring    Plan per attending physician, Dr. Brittani Mejia  PGY-1 Assessment  41y  at 35w6d presents for nausea and vomiting, likely mild viral illness.     Plan  - IV Fluids  - Zofran  - Continue to monitor  - NST reactive and reassuring    Plan per attending physician, Dr. Brittani Mejia  PGY-1      OB attending   42 yo  at 35w6d here for N/V for less than 24 hours, reports mild cramping   Patient seen and examined, started to have emesis last night and become dehydrated after vomiting q 10min.  Nausea improved now s/p IVF and zofran.      Vital Signs Last 24 Hrs  T(C): 36.6 (25 Mar 2022 05:50), Max: 36.6 (25 Mar 2022 05:19)  T(F): 97.9 (25 Mar 2022 05:50), Max: 97.9 (25 Mar 2022 05:50)  HR: 108 (25 Mar 2022 05:50) (108 - 108)  BP: 90/63 (25 Mar 2022 05:50) (90/63 - 90/63)  BP(mean): --  RR: 16 (25 Mar 2022 05:50) (16 - 16)  SpO2: 100% (25 Mar 2022 05:50) (100% - 100%)  Nst reactive  toco q 6-10 min  SVE closed as above   Labs reviewed and wnl    PLAN  -recommend PO trial --> if tolerates can DC home   -additional IVF dose of zofran 4mg prior to discharge  -rx sent to pharmacy for 4mg ODT q4h  -discussed expected course of likely viral gastroenteritis 24-48 hours, BRAT diet discussed, will call with changes     Dinora Cramer MD

## 2022-03-25 NOTE — OB PROVIDER TRIAGE NOTE - ATTENDING COMMENTS
Agree with above. Likely viral gastroenteritis. Pt for IVF and antiemetics. Will continue to observe until improving.    Marita Peter, DO

## 2022-03-25 NOTE — OB PROVIDER TRIAGE NOTE - HISTORY OF PRESENT ILLNESS
35w6d    Nausea since noon, vomiting 15x since midnight. Niece has stomach virus. No fevers, yes chills. No SOB or respiratory. + FM, -LOF, -VB, +CTX Last ate 1130pm    Uncomplicated prenatal care. EFW 5.7 lbs on 2020: emergent C/S     PNC, Biaxin, Ceclor R1 Triage H&P    Subjective  HPI: 41y  at 35w6d presents for nausea since noon, vomiting 15x since midnight. Last ate at 1130pm. Sick contact of niece has stomach virus. Denies fevers at home, endorsing chills. Denies any cough, shortness of breath, epigastric or RUQ pain.    +FM. -LOF. +CTXs. -VB. Pt denies any other concerns.    – PNC: Denies prenatal issues. EFW 2585g by court on Wed.  – OBHx: 2019 emergent C/S  – GynHx: denies  – PMH: denies  – PSH: denies  – Psych: denies   – Social: denies   – Meds: PNV   – Allergies: Biaxin, Ceclor, Penicillin  – Will accept blood transfusions? Yes    Objective  – VS  T(C): 36.6 (22 @ 05:19)  HR: 108 (22 @ 05:19)  BP: 90/63 (22 @ 05:19)  RR: 16 (22 @ 05:19)  SpO2: --    Physical Exam  CV: RRR  Pulm: breathing comfortably on RA  Abd: gravid, nontender  Extr: moving all extremities with ease R1 Triage H&P    Subjective  HPI: 41y  at 35w6d presents for nausea since noon, vomiting 15x since midnight. Last ate at 1130pm. Sick contact - niece has stomach virus. Denies fevers at home, endorsing chills. Denies any cough, shortness of breath, epigastric or RUQ pain.    +FM. -LOF. +CTXs. -VB. Pt denies any other concerns.    – PNC: Denies prenatal issues. EFW 2585g by court on Wed.  – OBHx: 2019 emergent C/S  – GynHx: denies  – PMH: denies  – PSH: denies  – Psych: denies   – Social: denies   – Meds: PNV   – Allergies: Biaxin, Ceclor, Penicillin  – Will accept blood transfusions? Yes    Objective  – VS  T(C): 36.6 (22 @ 05:19)  HR: 108 (22 @ 05:19)  BP: 90/63 (22 @ 05:19)  RR: 16 (22 @ 05:19)  SpO2: --    Physical Exam  CV: RRR  Pulm: breathing comfortably on RA  Abd: gravid, nontender  Extr: moving all extremities with ease

## 2022-04-20 ENCOUNTER — OUTPATIENT (OUTPATIENT)
Dept: OUTPATIENT SERVICES | Facility: HOSPITAL | Age: 42
LOS: 1 days | End: 2022-04-20
Payer: COMMERCIAL

## 2022-04-20 VITALS
SYSTOLIC BLOOD PRESSURE: 108 MMHG | RESPIRATION RATE: 16 BRPM | DIASTOLIC BLOOD PRESSURE: 78 MMHG | HEART RATE: 88 BPM | HEIGHT: 62 IN | WEIGHT: 147.05 LBS | TEMPERATURE: 99 F | OXYGEN SATURATION: 98 %

## 2022-04-20 DIAGNOSIS — Z98.891 HISTORY OF UTERINE SCAR FROM PREVIOUS SURGERY: Chronic | ICD-10-CM

## 2022-04-20 DIAGNOSIS — Z01.818 ENCOUNTER FOR OTHER PREPROCEDURAL EXAMINATION: ICD-10-CM

## 2022-04-20 DIAGNOSIS — O34.211 MATERNAL CARE FOR LOW TRANSVERSE SCAR FROM PREVIOUS CESAREAN DELIVERY: ICD-10-CM

## 2022-04-20 DIAGNOSIS — Z98.890 OTHER SPECIFIED POSTPROCEDURAL STATES: Chronic | ICD-10-CM

## 2022-04-20 DIAGNOSIS — O09.523 SUPERVISION OF ELDERLY MULTIGRAVIDA, THIRD TRIMESTER: ICD-10-CM

## 2022-04-20 DIAGNOSIS — Z98.891 HISTORY OF UTERINE SCAR FROM PREVIOUS SURGERY: ICD-10-CM

## 2022-04-20 DIAGNOSIS — Z11.52 ENCOUNTER FOR SCREENING FOR COVID-19: ICD-10-CM

## 2022-04-20 LAB
ANION GAP SERPL CALC-SCNC: 14 MMOL/L — SIGNIFICANT CHANGE UP (ref 5–17)
BLD GP AB SCN SERPL QL: NEGATIVE — SIGNIFICANT CHANGE UP
BUN SERPL-MCNC: 12 MG/DL — SIGNIFICANT CHANGE UP (ref 7–23)
CALCIUM SERPL-MCNC: 8.7 MG/DL — SIGNIFICANT CHANGE UP (ref 8.4–10.5)
CHLORIDE SERPL-SCNC: 102 MMOL/L — SIGNIFICANT CHANGE UP (ref 96–108)
CO2 SERPL-SCNC: 18 MMOL/L — LOW (ref 22–31)
CREAT SERPL-MCNC: 0.6 MG/DL — SIGNIFICANT CHANGE UP (ref 0.5–1.3)
EGFR: 116 ML/MIN/1.73M2 — SIGNIFICANT CHANGE UP
GLUCOSE SERPL-MCNC: 105 MG/DL — HIGH (ref 70–99)
HCT VFR BLD CALC: 35.4 % — SIGNIFICANT CHANGE UP (ref 34.5–45)
HGB BLD-MCNC: 11.2 G/DL — LOW (ref 11.5–15.5)
MCHC RBC-ENTMCNC: 29.7 PG — SIGNIFICANT CHANGE UP (ref 27–34)
MCHC RBC-ENTMCNC: 31.6 GM/DL — LOW (ref 32–36)
MCV RBC AUTO: 93.9 FL — SIGNIFICANT CHANGE UP (ref 80–100)
NRBC # BLD: 0 /100 WBCS — SIGNIFICANT CHANGE UP (ref 0–0)
PLATELET # BLD AUTO: 198 K/UL — SIGNIFICANT CHANGE UP (ref 150–400)
POTASSIUM SERPL-MCNC: 3.8 MMOL/L — SIGNIFICANT CHANGE UP (ref 3.5–5.3)
POTASSIUM SERPL-SCNC: 3.8 MMOL/L — SIGNIFICANT CHANGE UP (ref 3.5–5.3)
RBC # BLD: 3.77 M/UL — LOW (ref 3.8–5.2)
RBC # FLD: 12.3 % — SIGNIFICANT CHANGE UP (ref 10.3–14.5)
RH IG SCN BLD-IMP: POSITIVE — SIGNIFICANT CHANGE UP
SARS-COV-2 RNA SPEC QL NAA+PROBE: SIGNIFICANT CHANGE UP
SODIUM SERPL-SCNC: 134 MMOL/L — LOW (ref 135–145)
WBC # BLD: 7.24 K/UL — SIGNIFICANT CHANGE UP (ref 3.8–10.5)
WBC # FLD AUTO: 7.24 K/UL — SIGNIFICANT CHANGE UP (ref 3.8–10.5)

## 2022-04-20 PROCEDURE — 86850 RBC ANTIBODY SCREEN: CPT

## 2022-04-20 PROCEDURE — C9803: CPT

## 2022-04-20 PROCEDURE — 86901 BLOOD TYPING SEROLOGIC RH(D): CPT

## 2022-04-20 PROCEDURE — G0463: CPT

## 2022-04-20 PROCEDURE — U0005: CPT

## 2022-04-20 PROCEDURE — 85027 COMPLETE CBC AUTOMATED: CPT

## 2022-04-20 PROCEDURE — 80048 BASIC METABOLIC PNL TOTAL CA: CPT

## 2022-04-20 PROCEDURE — 36415 COLL VENOUS BLD VENIPUNCTURE: CPT

## 2022-04-20 PROCEDURE — U0003: CPT

## 2022-04-20 PROCEDURE — 86900 BLOOD TYPING SEROLOGIC ABO: CPT

## 2022-04-20 NOTE — OB PST NOTE - HISTORY OF PRESENT ILLNESS
42 yo F L1 LMP 21 presenting @ 39+weeks of gestation for repeat  on 22  **Pt denies any fever, chills, abdominal pain or sick contacts  **Covid 19 PCR on 22

## 2022-04-20 NOTE — OB PST NOTE - NSICDXFAMILYHX_GEN_ALL_CORE_FT
FAMILY HISTORY:  Mother  Still living? Yes, Estimated age: 61-70  FH: osteoporosis, Age at diagnosis: Age Unknown

## 2022-04-20 NOTE — OB PST NOTE - NSHPPHYSICALEXAM_GEN_ALL_CORE
PHYSICAL EXAM:      Constitutional: Alert O x 3, not in any distress    Eyes: PERRLA    ENMT: MP2    Neck: no JVD    Breasts: not examined    Back: WNL    Respiratory: CTA bilaterally    Cardiovascular: RRR, S1S2 reg    Gastrointestinal: Soft, +BS    Genitourinary: not examined    Rectal: not examined    Extremities: Trace pedal edema    Vascular: WNL    Neurological: WNL    Skin: W&D    Lymph Nodes: none palpable    Musculoskeletal: WNL    Psychiatric: normal Affect

## 2022-04-21 ENCOUNTER — OUTPATIENT (OUTPATIENT)
Dept: OUTPATIENT SERVICES | Facility: HOSPITAL | Age: 42
LOS: 1 days | End: 2022-04-21
Payer: COMMERCIAL

## 2022-04-21 ENCOUNTER — INPATIENT (INPATIENT)
Facility: HOSPITAL | Age: 42
LOS: 2 days | Discharge: HOME | End: 2022-04-24
Attending: STUDENT IN AN ORGANIZED HEALTH CARE EDUCATION/TRAINING PROGRAM | Admitting: STUDENT IN AN ORGANIZED HEALTH CARE EDUCATION/TRAINING PROGRAM
Payer: COMMERCIAL

## 2022-04-21 VITALS
DIASTOLIC BLOOD PRESSURE: 63 MMHG | HEART RATE: 64 BPM | SYSTOLIC BLOOD PRESSURE: 114 MMHG | TEMPERATURE: 98 F | RESPIRATION RATE: 17 BRPM

## 2022-04-21 VITALS
RESPIRATION RATE: 17 BRPM | HEART RATE: 72 BPM | SYSTOLIC BLOOD PRESSURE: 110 MMHG | TEMPERATURE: 98 F | OXYGEN SATURATION: 100 % | DIASTOLIC BLOOD PRESSURE: 56 MMHG

## 2022-04-21 VITALS — OXYGEN SATURATION: 99 % | HEART RATE: 72 BPM

## 2022-04-21 DIAGNOSIS — Z98.891 HISTORY OF UTERINE SCAR FROM PREVIOUS SURGERY: Chronic | ICD-10-CM

## 2022-04-21 DIAGNOSIS — Z3A.00 WEEKS OF GESTATION OF PREGNANCY NOT SPECIFIED: ICD-10-CM

## 2022-04-21 DIAGNOSIS — Z34.80 ENCOUNTER FOR SUPERVISION OF OTHER NORMAL PREGNANCY, UNSPECIFIED TRIMESTER: ICD-10-CM

## 2022-04-21 DIAGNOSIS — O26.899 OTHER SPECIFIED PREGNANCY RELATED CONDITIONS, UNSPECIFIED TRIMESTER: ICD-10-CM

## 2022-04-21 DIAGNOSIS — Z98.890 OTHER SPECIFIED POSTPROCEDURAL STATES: Chronic | ICD-10-CM

## 2022-04-21 LAB
BASOPHILS # BLD AUTO: 0.05 K/UL — SIGNIFICANT CHANGE UP (ref 0–0.2)
BASOPHILS NFR BLD AUTO: 0.5 % — SIGNIFICANT CHANGE UP (ref 0–2)
BLD GP AB SCN SERPL QL: NEGATIVE — SIGNIFICANT CHANGE UP
EOSINOPHIL # BLD AUTO: 0.03 K/UL — SIGNIFICANT CHANGE UP (ref 0–0.5)
EOSINOPHIL NFR BLD AUTO: 0.3 % — SIGNIFICANT CHANGE UP (ref 0–6)
HCT VFR BLD CALC: 35.5 % — SIGNIFICANT CHANGE UP (ref 34.5–45)
HGB BLD-MCNC: 11.5 G/DL — SIGNIFICANT CHANGE UP (ref 11.5–15.5)
IMM GRANULOCYTES NFR BLD AUTO: 0.5 % — SIGNIFICANT CHANGE UP (ref 0–1.5)
LYMPHOCYTES # BLD AUTO: 1.12 K/UL — SIGNIFICANT CHANGE UP (ref 1–3.3)
LYMPHOCYTES # BLD AUTO: 12.2 % — LOW (ref 13–44)
MCHC RBC-ENTMCNC: 30 PG — SIGNIFICANT CHANGE UP (ref 27–34)
MCHC RBC-ENTMCNC: 32.4 GM/DL — SIGNIFICANT CHANGE UP (ref 32–36)
MCV RBC AUTO: 92.7 FL — SIGNIFICANT CHANGE UP (ref 80–100)
MONOCYTES # BLD AUTO: 0.58 K/UL — SIGNIFICANT CHANGE UP (ref 0–0.9)
MONOCYTES NFR BLD AUTO: 6.3 % — SIGNIFICANT CHANGE UP (ref 2–14)
NEUTROPHILS # BLD AUTO: 7.36 K/UL — SIGNIFICANT CHANGE UP (ref 1.8–7.4)
NEUTROPHILS NFR BLD AUTO: 80.2 % — HIGH (ref 43–77)
NRBC # BLD: 0 /100 WBCS — SIGNIFICANT CHANGE UP (ref 0–0)
PLATELET # BLD AUTO: 209 K/UL — SIGNIFICANT CHANGE UP (ref 150–400)
RBC # BLD: 3.83 M/UL — SIGNIFICANT CHANGE UP (ref 3.8–5.2)
RBC # FLD: 12.4 % — SIGNIFICANT CHANGE UP (ref 10.3–14.5)
RH IG SCN BLD-IMP: POSITIVE — SIGNIFICANT CHANGE UP
WBC # BLD: 9.19 K/UL — SIGNIFICANT CHANGE UP (ref 3.8–10.5)
WBC # FLD AUTO: 9.19 K/UL — SIGNIFICANT CHANGE UP (ref 3.8–10.5)

## 2022-04-21 PROCEDURE — 59025 FETAL NON-STRESS TEST: CPT | Mod: 26

## 2022-04-21 PROCEDURE — 59025 FETAL NON-STRESS TEST: CPT

## 2022-04-21 PROCEDURE — G0463: CPT

## 2022-04-21 RX ORDER — SODIUM CHLORIDE 9 MG/ML
1000 INJECTION, SOLUTION INTRAVENOUS
Refills: 0 | Status: DISCONTINUED | OUTPATIENT
Start: 2022-04-21 | End: 2022-04-22

## 2022-04-21 RX ORDER — CITRIC ACID/SODIUM CITRATE 300-500 MG
15 SOLUTION, ORAL ORAL EVERY 6 HOURS
Refills: 0 | Status: DISCONTINUED | OUTPATIENT
Start: 2022-04-21 | End: 2022-04-22

## 2022-04-21 RX ORDER — OXYTOCIN 10 UNIT/ML
333.33 VIAL (ML) INJECTION
Qty: 20 | Refills: 0 | Status: DISCONTINUED | OUTPATIENT
Start: 2022-04-21 | End: 2022-04-24

## 2022-04-21 RX ADMIN — SODIUM CHLORIDE 125 MILLILITER(S): 9 INJECTION, SOLUTION INTRAVENOUS at 17:32

## 2022-04-21 RX ADMIN — SODIUM CHLORIDE 125 MILLILITER(S): 9 INJECTION, SOLUTION INTRAVENOUS at 17:31

## 2022-04-21 NOTE — OB RN TRIAGE NOTE - FALL HARM RISK - UNIVERSAL INTERVENTIONS
Bed in lowest position, wheels locked, appropriate side rails in place/Call bell, personal items and telephone in reach/Instruct patient to call for assistance before getting out of bed or chair/Non-slip footwear when patient is out of bed/[place] to call system/Physically safe environment - no spills, clutter or unnecessary equipment/Purposeful Proactive Rounding/Room/bathroom lighting operational, light cord in reach

## 2022-04-21 NOTE — OB PROVIDER TRIAGE NOTE - ADDITIONAL INSTRUCTIONS
The patient was instructed to return at 2p. Additionally, the patient was instructed to return earlier if she sees vaginal bleeding, has leakage of fluids, consistent painful contractions, does not feel the baby moving

## 2022-04-21 NOTE — OB RN PATIENT PROFILE - NAME OF FATHER, OB PROFILE
05-04 Na 141 mmol/L Glu 194 mg/dL<H> K+ 4.0 mmol/L Cr 0.32 mg/dL BUN 22 mg/dL Phos n/a    05-04 @ 05:31 POCT 88 mg/dL  05-04 @ 03:54 POCT 74 mg/dL  05-04 @ 02:29 POCT 66 mg/dL
Josh Ardon

## 2022-04-21 NOTE — OB PROVIDER TRIAGE NOTE - HISTORY OF PRESENT ILLNESS
The patient is a 42 y/o  EDC 2022 @ 39.5 weeks presenting to L&D with contractions Q10 mins starting at 12a, now Q7-8mins. The patient denies LOF, VB. endorses good fetal movement. The patient accepts all blood products    allergies:   PCN: hives  Biaxin: hives  Ceclor: hives    meds: PNV  GBS: negative  EFW: 7 lbs 9oz  PNC: unstable lie with breech presentation 1 week prior, became vertex on Tuesday    Medhx: pt denies cardiac, pulm, heme, GI, neuro  OBhx:  c/s arrest of descent at 9cm female 7 lbs 4oz  Sxhx: pt denies  Gynhx: +abn. pap with ELEAZAR (2015)  Sochx: pt denies  Famhx: pt denies

## 2022-04-21 NOTE — OB PROVIDER H&P - HISTORY OF PRESENT ILLNESS
Pt is a 40 yO  39w5d who presents for rule out labor. She is concerned that she is going into labor because she has been having crampy contractions every 10 minutes since midnight. Now every 7-8 minutes.     OB: Loss of fluids -, Vaginal bleeding -, CTX +, normal fetal movements.  Pt is a 42 yO  39w5d who presents for rule out labor. She is concerned that she is going into labor because she has been having crampy contractions every 10 minutes since midnight. Now every 7-8 minutes.     OB: Loss of fluids -, Vaginal bleeding -, CTX +, normal fetal movements. S/p 1 c section in 2020 for arrest of labor 7lbs 4oz without complications.   Denies fetal and placental complications of this pregnancy. Endorses migraines during pregnancy, last 2 weeks ago. Resolves with tylenol and rest. Denies gestHTN, diabetes, asthma, or any hospitalizations.  EFW: 3430 1 week ago  GBS: - swab of 1 week ago.  GYN: - Fibroids. - Cysts, +HPV in  s/p Leep, no abnormal paps since, no other STIs.     PMH: Just migraines as above.   PSH: Leep and C section, as above.  Meds: Just prenatal vitamins  Allergies: Hives in response to Penicillin, Biaxin and ceclor. No hx of anaphylaxis to any.   Social: Denies alcohol, cigarettes, and other drug use.  OK'd blood transfusion if necessary.

## 2022-04-21 NOTE — OB RN TRIAGE NOTE - FALL HARM RISK - UNIVERSAL INTERVENTIONS
Bed in lowest position, wheels locked, appropriate side rails in place/Call bell, personal items and telephone in reach/Instruct patient to call for assistance before getting out of bed or chair/Non-slip footwear when patient is out of bed/[place] to call system/Physically safe environment - no spills, clutter or unnecessary equipment/Purposeful Proactive Rounding/Room/bathroom lighting operational, light cord in reach

## 2022-04-21 NOTE — OB PROVIDER TRIAGE NOTE - NSHPPHYSICALEXAM_GEN_ALL_CORE
ICU Vital Signs Last 24 Hrs  T(C): 36.8 (21 Apr 2022 10:49), Max: 37 (20 Apr 2022 16:52)  T(F): 98.2 (21 Apr 2022 10:49), Max: 98.6 (20 Apr 2022 16:52)  HR: 72 (21 Apr 2022 11:42) (64 - 88)  BP: 114/63 (21 Apr 2022 10:49) (108/78 - 114/63)  BP(mean): --  ABP: --  ABP(mean): --  RR: 17 (21 Apr 2022 10:49) (16 - 17)  SpO2: 99% (21 Apr 2022 11:42) (98% - 100%)    gen: A&Ox3  CV: rrr s1s2  abd: gravid soft  VE: 2.5/80/-3 intact  EFM: 135 moderate variability, + acels, -decels category 1 tracing  toco: Q5-7mins  bedside sonogram: cephalic presentation

## 2022-04-21 NOTE — OB PROVIDER H&P - ASSESSMENT
42 y/o  @ 39.5 weeks admitted in early labor, attempting TOLAC  -admit to l&D  -routine labs  -NPO bicitra  -EFM/toco  -IV hydration  -expectant management  -anesthesia consult  -anticipated     d/w Dr. Xiomy Eng NP

## 2022-04-21 NOTE — OB PROVIDER TRIAGE NOTE - NSOBPROVIDERNOTE_OBGYN_ALL_OB_FT
40 y/o  @ 39.5 weeks presenting to L&D with contractions early labor  -EFM/toco  -patient to ambulate x 2 hrs and return for a VE    Dr. tSauffer at bedside evaluating patient  Nargis Eng NP

## 2022-04-21 NOTE — PRE-ANESTHESIA EVALUATION ADULT - NSANTHPMHFT_GEN_ALL_CORE
No cardiac or pulmonary disease  No bleeding or clotting disorders  Denies issues with current pregnancy  TOLAC

## 2022-04-21 NOTE — OB PROVIDER LABOR PROGRESS NOTE - NS_OBIHIFHRDETAILS_OBGYN_ALL_OB_FT
baseline 150s, moderate variability, +accels, -decels baseline 130s, moderate variability, +accels, -decels

## 2022-04-21 NOTE — OB RN PATIENT PROFILE - FALL HARM RISK - UNIVERSAL INTERVENTIONS
Bed in lowest position, wheels locked, appropriate side rails in place/Call bell, personal items and telephone in reach/Instruct patient to call for assistance before getting out of bed or chair/Non-slip footwear when patient is out of bed/[place] to call system/Physically safe environment - no spills, clutter or unnecessary equipment/Purposeful Proactive Rounding/Room/bathroom lighting operational, light cord in reach

## 2022-04-21 NOTE — OB PROVIDER H&P - HISTORY OF PRESENT ILLNESS
The patient is a 40 y/o  EDC 2022 @ 39.5 weeks presenting to L&D with contractions Q10 mins starting at 12a, now Q7-8mins. The patient returned from walking with same exam. The patient denies LOF, VB. endorses good fetal movement. The patient accepts all blood products    allergies:   PCN: hives  Biaxin: hives  Ceclor: hives    meds: PNV  GBS: negative  EFW: 7 lbs 9oz  PNC: unstable lie with breech presentation 1 week prior, became vertex on Tuesday    Medhx: pt denies cardiac, pulm, heme, GI, neuro  OBhx:  c/s arrest of descent at 9cm female 7 lbs 4oz  Sxhx: pt denies  Gynhx: +abn. pap with ELEAZAR (2015)  Sochx: pt denies  Famhx: pt denies

## 2022-04-21 NOTE — OB PROVIDER TRIAGE NOTE - NS_FETALMOVEMENT_OBGYN_ALL_OB
Problem: Infant Inpatient Plan of Care  Goal: Plan of Care Review  Outcome: Ongoing (interventions implemented as appropriate)  No contact from family thus far. Temp stable in open crib. Nippling fairly well ad kaz volume. Voiding and stooling. Holding upright after feeds. Maintaining central line for cefepime dosing. Will continue to monitor.       Present, unchanged

## 2022-04-22 ENCOUNTER — TRANSCRIPTION ENCOUNTER (OUTPATIENT)
Age: 42
End: 2022-04-22

## 2022-04-22 LAB
COVID-19 SPIKE DOMAIN AB INTERP: POSITIVE
COVID-19 SPIKE DOMAIN ANTIBODY RESULT: >250 U/ML — HIGH
SARS-COV-2 IGG+IGM SERPL QL IA: >250 U/ML — HIGH
SARS-COV-2 IGG+IGM SERPL QL IA: POSITIVE
T PALLIDUM AB TITR SER: NEGATIVE — SIGNIFICANT CHANGE UP

## 2022-04-22 RX ORDER — IBUPROFEN 200 MG
600 TABLET ORAL EVERY 6 HOURS
Refills: 0 | Status: COMPLETED | OUTPATIENT
Start: 2022-04-22 | End: 2023-03-21

## 2022-04-22 RX ORDER — SIMETHICONE 80 MG/1
80 TABLET, CHEWABLE ORAL EVERY 4 HOURS
Refills: 0 | Status: DISCONTINUED | OUTPATIENT
Start: 2022-04-22 | End: 2022-04-24

## 2022-04-22 RX ORDER — OXYTOCIN 10 UNIT/ML
VIAL (ML) INJECTION
Qty: 30 | Refills: 0 | Status: DISCONTINUED | OUTPATIENT
Start: 2022-04-22 | End: 2022-04-22

## 2022-04-22 RX ORDER — TETANUS TOXOID, REDUCED DIPHTHERIA TOXOID AND ACELLULAR PERTUSSIS VACCINE, ADSORBED 5; 2.5; 8; 8; 2.5 [IU]/.5ML; [IU]/.5ML; UG/.5ML; UG/.5ML; UG/.5ML
0.5 SUSPENSION INTRAMUSCULAR ONCE
Refills: 0 | Status: DISCONTINUED | OUTPATIENT
Start: 2022-04-22 | End: 2022-04-24

## 2022-04-22 RX ORDER — ACETAMINOPHEN 500 MG
975 TABLET ORAL
Refills: 0 | Status: DISCONTINUED | OUTPATIENT
Start: 2022-04-22 | End: 2022-04-24

## 2022-04-22 RX ORDER — MAGNESIUM HYDROXIDE 400 MG/1
30 TABLET, CHEWABLE ORAL
Refills: 0 | Status: DISCONTINUED | OUTPATIENT
Start: 2022-04-22 | End: 2022-04-24

## 2022-04-22 RX ORDER — IBUPROFEN 200 MG
600 TABLET ORAL EVERY 6 HOURS
Refills: 0 | Status: DISCONTINUED | OUTPATIENT
Start: 2022-04-22 | End: 2022-04-24

## 2022-04-22 RX ORDER — SODIUM CHLORIDE 9 MG/ML
3 INJECTION INTRAMUSCULAR; INTRAVENOUS; SUBCUTANEOUS EVERY 8 HOURS
Refills: 0 | Status: DISCONTINUED | OUTPATIENT
Start: 2022-04-22 | End: 2022-04-24

## 2022-04-22 RX ORDER — OXYTOCIN 10 UNIT/ML
333.33 VIAL (ML) INJECTION
Qty: 20 | Refills: 0 | Status: DISCONTINUED | OUTPATIENT
Start: 2022-04-22 | End: 2022-04-24

## 2022-04-22 RX ORDER — OXYCODONE HYDROCHLORIDE 5 MG/1
5 TABLET ORAL ONCE
Refills: 0 | Status: DISCONTINUED | OUTPATIENT
Start: 2022-04-22 | End: 2022-04-24

## 2022-04-22 RX ORDER — AER TRAVELER 0.5 G/1
1 SOLUTION RECTAL; TOPICAL EVERY 4 HOURS
Refills: 0 | Status: DISCONTINUED | OUTPATIENT
Start: 2022-04-22 | End: 2022-04-24

## 2022-04-22 RX ORDER — DIBUCAINE 1 %
1 OINTMENT (GRAM) RECTAL EVERY 6 HOURS
Refills: 0 | Status: DISCONTINUED | OUTPATIENT
Start: 2022-04-22 | End: 2022-04-24

## 2022-04-22 RX ORDER — KETOROLAC TROMETHAMINE 30 MG/ML
30 SYRINGE (ML) INJECTION ONCE
Refills: 0 | Status: DISCONTINUED | OUTPATIENT
Start: 2022-04-22 | End: 2022-04-22

## 2022-04-22 RX ORDER — BENZOCAINE 10 %
1 GEL (GRAM) MUCOUS MEMBRANE EVERY 6 HOURS
Refills: 0 | Status: DISCONTINUED | OUTPATIENT
Start: 2022-04-22 | End: 2022-04-24

## 2022-04-22 RX ORDER — HYDROCORTISONE 1 %
1 OINTMENT (GRAM) TOPICAL EVERY 6 HOURS
Refills: 0 | Status: DISCONTINUED | OUTPATIENT
Start: 2022-04-22 | End: 2022-04-24

## 2022-04-22 RX ORDER — PRAMOXINE HYDROCHLORIDE 150 MG/15G
1 AEROSOL, FOAM RECTAL EVERY 4 HOURS
Refills: 0 | Status: DISCONTINUED | OUTPATIENT
Start: 2022-04-22 | End: 2022-04-24

## 2022-04-22 RX ORDER — LANOLIN
1 OINTMENT (GRAM) TOPICAL EVERY 6 HOURS
Refills: 0 | Status: DISCONTINUED | OUTPATIENT
Start: 2022-04-22 | End: 2022-04-24

## 2022-04-22 RX ORDER — OXYCODONE HYDROCHLORIDE 5 MG/1
5 TABLET ORAL
Refills: 0 | Status: DISCONTINUED | OUTPATIENT
Start: 2022-04-22 | End: 2022-04-24

## 2022-04-22 RX ORDER — DIPHENHYDRAMINE HCL 50 MG
25 CAPSULE ORAL EVERY 6 HOURS
Refills: 0 | Status: DISCONTINUED | OUTPATIENT
Start: 2022-04-22 | End: 2022-04-24

## 2022-04-22 RX ADMIN — Medication 30 MILLIGRAM(S): at 17:46

## 2022-04-22 RX ADMIN — Medication 975 MILLIGRAM(S): at 21:59

## 2022-04-22 RX ADMIN — Medication 2 MILLIUNIT(S)/MIN: at 03:20

## 2022-04-22 RX ADMIN — Medication 975 MILLIGRAM(S): at 22:30

## 2022-04-22 RX ADMIN — SODIUM CHLORIDE 3 MILLILITER(S): 9 INJECTION INTRAMUSCULAR; INTRAVENOUS; SUBCUTANEOUS at 21:50

## 2022-04-22 RX ADMIN — Medication 600 MILLIGRAM(S): at 23:44

## 2022-04-22 NOTE — DISCHARGE NOTE OB - CARE PROVIDER_API CALL
Dinora Cramer)  Obstetrics and Gynecology  [address], [address]  [place] [address]  Phone: [phone]  Fax: [phone]  Follow Up Time: Routine

## 2022-04-22 NOTE — DISCHARGE NOTE OB - CARE PLAN
1 Principal Discharge DX:	 (vaginal birth after )  Assessment and plan of treatment:	After discharge, please stay on pelvic rest for 6 weeks, meaning no sexual intercourse, no tampons and no douching.  No driving for 2 weeks as women can loose a lot of blood during delivery and there is a possibility of being lightheaded/fainting.  No lifting objects heavier than baby for two weeks.  Expect to have vaginal bleeding/spotting for up to six weeks.  The bleeding should get lighter and more white/light brown with time.  For bleeding soaking more than a pad an hour or passing clots greater than the size of your fist, come in to the emergency department.

## 2022-04-22 NOTE — OB PROVIDER LABOR PROGRESS NOTE - NSVAGINALEXAM_OBGYN_ALL_OB_DT
22-Apr-2022 02:19
21-Apr-2022 20:02
22-Apr-2022 15:34
22-Apr-2022 11:22
22-Apr-2022 09:45
22-Apr-2022 14:09

## 2022-04-22 NOTE — DISCHARGE NOTE OB - HOSPITAL COURSE
Patient admitted in early labor and had an uncomplicated vaginal delivery after  section.  Patient had an unremarkable postpartum course and was stable for discharge home on postpartum day  Patient admitted in early labor and had an uncomplicated vaginal delivery after  section.  Patient had an unremarkable postpartum course and was stable for discharge home on postpartum day 2

## 2022-04-22 NOTE — DISCHARGE NOTE OB - PLAN OF CARE
Start Vitamin D 2000 IU,  (5 ml) daily     Please give pudding or protein supplement twice daily in between meals     Follow up in 2 months.   
After discharge, please stay on pelvic rest for 6 weeks, meaning no sexual intercourse, no tampons and no douching.  No driving for 2 weeks as women can loose a lot of blood during delivery and there is a possibility of being lightheaded/fainting.  No lifting objects heavier than baby for two weeks.  Expect to have vaginal bleeding/spotting for up to six weeks.  The bleeding should get lighter and more white/light brown with time.  For bleeding soaking more than a pad an hour or passing clots greater than the size of your fist, come in to the emergency department.

## 2022-04-22 NOTE — OB PROVIDER LABOR PROGRESS NOTE - NS_SUBJECTIVE/OBJECTIVE_OBGYN_ALL_OB_FT
OB attending     Patient feeling more pressure    Vital Signs Last 24 Hrs  T(C): 37.3 (22 Apr 2022 09:14), Max: 37.5 (22 Apr 2022 06:44)  T(F): 99.14 (22 Apr 2022 09:14), Max: 99.5 (22 Apr 2022 06:44)  HR: 61 (22 Apr 2022 11:20) (47 - 97)  BP: 118/66 (22 Apr 2022 11:13) (93/51 - 152/94)  BP(mean): --  RR: 18 (22 Apr 2022 06:44) (14 - 18)  SpO2: 99% (22 Apr 2022 11:20) (91% - 100%)
Assessed pt for cervical change.
OB Attending Progress note     Patient is pushing    OA position confirmed with sonogram   Pushing with station 1+  Pushing with every other contraction to allow for recovery
OB attending progress notes    Patient admitted for  yesterday.  40 yo  at 39w6d   Hx of c/s x 1 due to failure to progress pass 9cm, OP position     Vital Signs Last 24 Hrs  T(C): 37.3 (2022 09:14), Max: 37.5 (2022 06:44)  T(F): 99.14 (2022 09:14), Max: 99.5 (2022 06:44)  HR: 70 (2022 09:55) (47 - 97)  BP: 129/71 (2022 09:44) (93/51 - 152/94)  BP(mean): --  RR: 18 (2022 06:44) (14 - 18)  SpO2: 99% (2022 09:55) (91% - 100%)    SVE below   EFW 3629g  GBS neg  CBC 9/11/35/209  O+/ab neg
Pt consents to AROM. AROM performed, clear fluid.
OB attending     Patient pushing well, pain controlled with epidural .      Vital Signs Last 24 Hrs  T(C): 37.2 (22 Apr 2022 12:53), Max: 37.5 (22 Apr 2022 06:44)  T(F): 98.96 (22 Apr 2022 12:53), Max: 99.5 (22 Apr 2022 06:44)  HR: 68 (22 Apr 2022 15:31) (47 - 110)  BP: 132/64 (22 Apr 2022 15:15) (93/51 - 152/94)  BP(mean): --  RR: 18 (22 Apr 2022 06:44) (14 - 18)  SpO2: 98% (22 Apr 2022 15:31) (80% - 100%)

## 2022-04-22 NOTE — OB PROVIDER IHI INDUCTION/AUGMENTATION NOTE - LABOR: CERVICAL EFFACEMENT
Medical Week 3 Survey      Responses   [facility] patient discharged from?  [place]   Does the patient have one of the following disease processes/diagnoses(primary or secondary)?  Other   Week 3 attempt successful?  No   Unsuccessful attempts  Attempt 1          Swati Do RN  
Greater than 75%

## 2022-04-22 NOTE — OB PROVIDER LABOR PROGRESS NOTE - ASSESSMENT
LOREN, now s/p MARVA Laura, PGY2
40 yo  at 39w6d undergoing TOLAC, now in second stage with   cat 2 tracing, making progress with pushing.   -continue pushing with every other contractions  -OA position confirmed    Dinora Cramer MD 
a/p 42 YO p1001 AT 39W6D UNDERGOIND   Plan to allow for passive descent and start pushing in 30 min    Dinora Cramer MD 
42 yo  at 39w6d undergoing TOLAC, now in second stage with   cat 2 tracing, making progress with pushing.   -continue pushing with every other contractions      Dinora Cramer MD 
Continue exp management at this time    Jessica, PGY2
A/P: 42 yo  at 39w6d admitted for  now in active labor.   -reassess 2h  -continue pitocin   -GBS neg   -cat 1 tracing    Dinora Cramer MD

## 2022-04-22 NOTE — OB PROVIDER DELIVERY SUMMARY - NSPROVIDERDELIVERYNOTE_OBGYN_ALL_OB_FT
Patient undergoing TOLAC, RML performed due to maternal exhaustion and category 2 tracing.  Live male delivered from CHERISE position.    Delayed cord clamping x 1 min.   Placenta delivered spontaneously and intact. Uterus explored, firm fundus, no evidence of retained placenta, no defect in uterine scar palpated.    Cervix and sulci intact.   RML repaired in usual fashion as above.

## 2022-04-22 NOTE — OB RN DELIVERY SUMMARY - NS_SEPSISRSKCALC_OBGYN_ALL_OB_FT
'Type of intrapartum antibiotics' must be entered above.   EOS calculated successfully. EOS Risk Factor: 0.5/1000 live births (Formerly named [facility] national incidence); GA=39w6d; Temp=99.5; ROM=20.167; GBS='Negative'; Antibiotics='No antibiotics or any antibiotics < 2 hrs prior to birth'

## 2022-04-22 NOTE — DISCHARGE NOTE OB - NS MD DC FALL RISK RISK
For information on Fall & Injury Prevention, visit: [email or web address] OR  [email or web address] OR  https://www.cdc.gov/steadi/patient.html

## 2022-04-22 NOTE — OB RN DELIVERY SUMMARY - NSSELHIDDEN_OBGYN_ALL_OB_FT
[NS_DeliveryAttending1_OBGYN_ALL_OB_FT:[id number]],[NS_DeliveryRN_OBGYN_ALL_OB_FT:[id number]]

## 2022-04-22 NOTE — DISCHARGE NOTE OB - PATIENT PORTAL LINK FT
You can access the FollowMyHealth Patient Portal offered by [facility] by registering at the following website: [email or web address]. By joining [employer]’s FollowMyHealth portal, you will also be able to view your health information using other applications (apps) compatible with our system.

## 2022-04-23 RX ADMIN — Medication 975 MILLIGRAM(S): at 09:40

## 2022-04-23 RX ADMIN — Medication 975 MILLIGRAM(S): at 16:20

## 2022-04-23 RX ADMIN — Medication 975 MILLIGRAM(S): at 09:08

## 2022-04-23 RX ADMIN — Medication 600 MILLIGRAM(S): at 12:25

## 2022-04-23 RX ADMIN — Medication 600 MILLIGRAM(S): at 18:23

## 2022-04-23 RX ADMIN — Medication 975 MILLIGRAM(S): at 15:46

## 2022-04-23 RX ADMIN — Medication 1 TABLET(S): at 12:25

## 2022-04-23 RX ADMIN — Medication 600 MILLIGRAM(S): at 05:54

## 2022-04-23 RX ADMIN — Medication 600 MILLIGRAM(S): at 13:00

## 2022-04-23 NOTE — PROGRESS NOTE ADULT - ASSESSMENT
A/P: 40yo PPD#1 s/p . Patient is stable and doing well post-partum.   - Pain well controlled, continue current pain regimen  - Increase ambulation, SCDs when not ambulating  - Continue regular diet    Pari Gibbs PGY-1

## 2022-04-24 VITALS
HEART RATE: 68 BPM | OXYGEN SATURATION: 98 % | TEMPERATURE: 98 F | RESPIRATION RATE: 18 BRPM | SYSTOLIC BLOOD PRESSURE: 105 MMHG | DIASTOLIC BLOOD PRESSURE: 67 MMHG

## 2022-04-24 PROCEDURE — 86850 RBC ANTIBODY SCREEN: CPT

## 2022-04-24 PROCEDURE — 86900 BLOOD TYPING SEROLOGIC ABO: CPT

## 2022-04-24 PROCEDURE — 85025 COMPLETE CBC W/AUTO DIFF WBC: CPT

## 2022-04-24 PROCEDURE — 36415 COLL VENOUS BLD VENIPUNCTURE: CPT

## 2022-04-24 PROCEDURE — 59025 FETAL NON-STRESS TEST: CPT

## 2022-04-24 PROCEDURE — 86901 BLOOD TYPING SEROLOGIC RH(D): CPT

## 2022-04-24 PROCEDURE — 59050 FETAL MONITOR W/REPORT: CPT

## 2022-04-24 PROCEDURE — 86780 TREPONEMA PALLIDUM: CPT

## 2022-04-24 PROCEDURE — G0463: CPT

## 2022-04-24 PROCEDURE — 86769 SARS-COV-2 COVID-19 ANTIBODY: CPT

## 2022-04-24 RX ORDER — L.ACIDOPH/B.ANIMALIS/B.LONGUM 15B CELL
1 CAPSULE ORAL
Qty: 0 | Refills: 0 | DISCHARGE

## 2022-04-24 RX ORDER — IBUPROFEN 200 MG
1 TABLET ORAL
Qty: 0 | Refills: 0 | DISCHARGE
Start: 2022-04-24

## 2022-04-24 RX ORDER — CHOLECALCIFEROL (VITAMIN D3) 125 MCG
1 CAPSULE ORAL
Qty: 0 | Refills: 0 | DISCHARGE

## 2022-04-24 RX ADMIN — Medication 600 MILLIGRAM(S): at 00:07

## 2022-04-24 RX ADMIN — Medication 975 MILLIGRAM(S): at 02:59

## 2022-04-24 RX ADMIN — Medication 600 MILLIGRAM(S): at 00:45

## 2022-04-24 RX ADMIN — Medication 975 MILLIGRAM(S): at 03:30

## 2022-04-24 NOTE — PROGRESS NOTE ADULT - SUBJECTIVE AND OBJECTIVE BOX
OB Progress Note:  PPD#1    S: 42yo  PPD#1 s/p . Patient feels well. Pain is well controlled, tolerating a regular diet, passing flatus, voiding spontaneously and ambulating without difficulty. Denies heavy vaginal bleeding, CP/SOB, lightheadedness/dizziness, N/V.     O:  Vitals:  Vital Signs Last 24 Hrs  T(C): 36.3 (2022 05:57), Max: 37.4 (2022 16:45)  T(F): 97.4 (2022 05:57), Max: 99.3 (2022 16:45)  HR: 57 (2022 05:57) (48 - 110)  BP: 116/66 (2022 05:57) (100/57 - 150/126)  BP(mean): --  RR: 18 (2022 05:57) (18 - 18)  SpO2: 98% (2022 05:57) (80% - 100%)    MEDICATIONS  (STANDING):  acetaminophen     Tablet .. 975 milliGRAM(s) Oral <User Schedule>  diphtheria/tetanus/pertussis (acellular) Vaccine (ADAcel) 0.5 milliLiter(s) IntraMuscular once  ibuprofen  Tablet. 600 milliGRAM(s) Oral every 6 hours  oxytocin Infusion 333.333 milliUNIT(s)/Min (1000 mL/Hr) IV Continuous <Continuous>  oxytocin Infusion 333.333 milliUNIT(s)/Min (1000 mL/Hr) IV Continuous <Continuous>  prenatal multivitamin 1 Tablet(s) Oral daily  sodium chloride 0.9% lock flush 3 milliLiter(s) IV Push every 8 hours      Labs:  Blood type: O Positive  Rubella IgG: RPR: Negative                          11.5   9.19 >-----------< 209    (  @ 16:52 )             35.5                        11.2<L>   7.24 >-----------< 198    (  @ 18:17 )             35.4    22 @ 18:17      134<L>  |  102  |  12  ----------------------------<  105<H>  3.8   |  18<L>  |  0.60        Ca    8.7      2022 18:17            Physical Exam:  General: NAD  Abdomen: soft, non-tender, non-distended, fundus firm  Vaginal: No heavy vaginal bleeding  Extremities: No erythema/edema    
OB Progress Note:  PPD#2    S: 42yo PPD#2 s/p . Patient feels well. Pain is well controlled. She is tolerating a regular diet and passing flatus. She is voiding spontaneously, and ambulating without difficulty. Denies CP/SOB. Denies lightheadedness/dizziness. Denies N/V.    O:  Vitals:   Vital Signs Last 24 Hrs  T(C): 36.4 (2022 17:02), Max: 36.4 (2022 13:00)  T(F): 97.5 (2022 17:02), Max: 97.6 (2022 13:00)  HR: 60 (2022 17:02) (51 - 66)  BP: 129/78 (2022 17:02) (110/66 - 129/78)  BP(mean): --  RR: 18 (2022 17:02) (18 - 18)  SpO2: 99% (2022 17:02) (97% - 99%)    MEDICATIONS  (STANDING):  acetaminophen     Tablet .. 975 milliGRAM(s) Oral <User Schedule>  diphtheria/tetanus/pertussis (acellular) Vaccine (ADAcel) 0.5 milliLiter(s) IntraMuscular once  ibuprofen  Tablet. 600 milliGRAM(s) Oral every 6 hours  oxytocin Infusion 333.333 milliUNIT(s)/Min (1000 mL/Hr) IV Continuous <Continuous>  oxytocin Infusion 333.333 milliUNIT(s)/Min (1000 mL/Hr) IV Continuous <Continuous>  prenatal multivitamin 1 Tablet(s) Oral daily  sodium chloride 0.9% lock flush 3 milliLiter(s) IV Push every 8 hours    MEDICATIONS  (PRN):  benzocaine 20%/menthol 0.5% Spray 1 Spray(s) Topical every 6 hours PRN for Perineal discomfort  dibucaine 1% Ointment 1 Application(s) Topical every 6 hours PRN Perineal discomfort  diphenhydrAMINE 25 milliGRAM(s) Oral every 6 hours PRN Pruritus  hydrocortisone 1% Cream 1 Application(s) Topical every 6 hours PRN Moderate Pain (4-6)  lanolin Ointment 1 Application(s) Topical every 6 hours PRN nipple soreness  magnesium hydroxide Suspension 30 milliLiter(s) Oral two times a day PRN Constipation  oxyCODONE    IR 5 milliGRAM(s) Oral every 3 hours PRN Moderate to Severe Pain (4-10)  oxyCODONE    IR 5 milliGRAM(s) Oral once PRN Moderate to Severe Pain (4-10)  pramoxine 1%/zinc 5% Cream 1 Application(s) Topical every 4 hours PRN Moderate Pain (4-6)  simethicone 80 milliGRAM(s) Chew every 4 hours PRN Gas  witch hazel Pads 1 Application(s) Topical every 4 hours PRN Perineal discomfort      Labs:  Blood type: O Positive  Rubella IgG: RPR: Negative                          11.5   9.19 >-----------< 209    (  @ 16:52 )             35.5                  Physical Exam:  General: NAD  Abdomen: soft, non-tender, non-distended, fundus firm  Vaginal: Lochia wnl  Extremities: No erythema/edema  
OB Attending Note    S: Patient doing well. Minimal lochia. Pain controlled.    O: Vital Signs Last 24 Hrs  T(C): 36.8 (2022 09:34), Max: 36.8 (2022 09:34)  T(F): 98.2 (2022 09:34), Max: 98.2 (2022 09:34)  HR: 68 (2022 09:34) (51 - 68)  BP: 105/67 (2022 09:34) (105/67 - 129/78)  BP(mean): --  RR: 18 (2022 09:34) (18 - 18)  SpO2: 98% (2022 09:34) (98% - 99%)    Gen: NAD  Abd: soft, NT, ND, fundus firm below umbilicus  Lochia: min  Perineum healing well  Ext: no tenderness    Labs:      A: 41y PPD#2 s/p  doing well.    Plan: cont PP care  OOB/ambulation  Pain control  Discharge home. Discharge instructions given. FU in 4-6 weeks    Lulu Martin DO 
OB Attending Note    S: Patient doing well. Minimal lochia. Pain controlled.    O: Vital Signs Last 24 Hrs  T(C): 36.3 (2022 08:45), Max: 37.4 (2022 16:45)  T(F): 97.3 (2022 08:45), Max: 99.3 (2022 16:45)  HR: 66 (2022 08:45) (48 - 110)  BP: 110/66 (2022 08:45) (100/57 - 150/126)  BP(mean): --  RR: 18 (2022 08:45) (18 - 18)  SpO2: 97% (2022 08:45) (80% - 100%)    Gen: NAD  Abd: soft, NT, ND, fundus firm below umbilicus  Lochia: min  Perineum healing well  Ext: no tenderness    Labs:                        11.5   9.19  )-----------( 209      ( 2022 16:52 )             35.5       A: 41y PPD#1 s/p  doing well.    Plan: cont PP care  OOB/ambulation  Pain control  Desires  circumcision. Risks reviewed including bleeding, infection, damage to surrounding structures, need for future surgery.    Lulu Martin DO

## 2022-04-24 NOTE — PROGRESS NOTE ADULT - ASSESSMENT
A/P: 42yo PPD#2 s/p .  Patient is stable and doing well post-partum.    - Pain well controlled, continue current pain regimen  - Increase ambulation, SCDs when not ambulating  - Continue regular diet  - Discharge planning     Kalyani Marshall, PGY1

## 2022-10-31 ENCOUNTER — RESULT REVIEW (OUTPATIENT)
Age: 42
End: 2022-10-31

## 2022-11-15 PROBLEM — G43.909 MIGRAINE, UNSPECIFIED, NOT INTRACTABLE, WITHOUT STATUS MIGRAINOSUS: Chronic | Status: ACTIVE | Noted: 2022-04-20

## 2022-11-15 PROBLEM — B97.7 PAPILLOMAVIRUS AS THE CAUSE OF DISEASES CLASSIFIED ELSEWHERE: Chronic | Status: ACTIVE | Noted: 2022-04-20

## 2023-01-21 ENCOUNTER — APPOINTMENT (OUTPATIENT)
Dept: ULTRASOUND IMAGING | Facility: CLINIC | Age: 43
End: 2023-01-21
Payer: COMMERCIAL

## 2023-01-21 ENCOUNTER — APPOINTMENT (OUTPATIENT)
Dept: MAMMOGRAPHY | Facility: CLINIC | Age: 43
End: 2023-01-21
Payer: COMMERCIAL

## 2023-01-21 PROCEDURE — 77063 BREAST TOMOSYNTHESIS BI: CPT

## 2023-01-21 PROCEDURE — 77067 SCR MAMMO BI INCL CAD: CPT

## 2023-01-21 PROCEDURE — 76641 ULTRASOUND BREAST COMPLETE: CPT | Mod: 50

## 2023-01-25 ENCOUNTER — APPOINTMENT (OUTPATIENT)
Dept: ULTRASOUND IMAGING | Facility: CLINIC | Age: 43
End: 2023-01-25
Payer: COMMERCIAL

## 2023-01-25 ENCOUNTER — APPOINTMENT (OUTPATIENT)
Dept: MAMMOGRAPHY | Facility: CLINIC | Age: 43
End: 2023-01-25
Payer: COMMERCIAL

## 2023-01-25 PROCEDURE — 77066 DX MAMMO INCL CAD BI: CPT

## 2023-01-25 PROCEDURE — G0279: CPT

## 2023-01-25 PROCEDURE — 76642 ULTRASOUND BREAST LIMITED: CPT | Mod: RT

## 2023-01-27 NOTE — OB RN PATIENT PROFILE - FALL HARM RISK - PATIENT NEEDS ASSISTANCE
21F med hx depression on lexapro and trazodone pw episode of "blacking out" pt notes she was at a friends house smoking thc when she felt like she couldn't move her arms and her legs. this has happened to her before. she notes that sometimes it corresponds to thc smoking or trazodone taking. no cp, no sob, no ha, no fever, no seizure activity
No assistance needed

## 2023-05-24 NOTE — OB PROVIDER H&P - [facility]_ALL_OB
OFFICE VISIT    Patient: Kylee Hollins   : 1994 MRN: [id number]    SUBJECTIVE:  Chief Complaint   Patient presents with   • Physical   • New Patient   • Establish Care       Patient has given consent to record this visit for documentation in their clinical record.    A 29 year old female presents for an establishment of care and physical exam.      HISTORY OF PRESENT ILLNESS:  Historian: Self.    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 , Les and a dog named Loyd. Loves spending time with family and being outside. Loves baking and cooking. Recently got  on .     Never-smoker. No vaping.  Occasional alcohol use.  No substance use.    Family history: updated.    Routine physical examination  Diet and exercise:  Does not follow healthy regular diet. Mentions eats vegetables and fruits but also eats processed foods like chips and candies, at least twice a week. Does not exercise regularly. Did exercise previously before wedding. Will start now. Mentions ran for last two mornings.     Immunization: Declines vaccines today. Mentions was sick prior to wedding.    Screening Labs: Recent lab reports reveal  Mentions blood draw prior to this visit ordered by Dr. Andrade. Cholesterol is 256 mg/dL, little elevated from previous years ago. Triglycerides are 84 mg/dL. HDL is 83 mg/dL. LDL increased to 156 mg/dL. Non-HDL is 173 mg/dL. Cholesterol ratio is normal. Mentions has cholesterol test through work in Fall. HbA1c is 5.3%. Sodium, potassium, bicarbonate, anion gap, and blood sugar are normal. Kidney function is normal. Bilirubin is high. Mentions got new job, and not thinking of having kids at present but will decide by 32-years-old. Does not want geriatric pregnancy.     Seasonal allergies:  Reports seasonal allergy. Not using Flonase nasal spray. On Claritin with benefits. Denies trouble with breathing.     Family history of thyroid  disease:  Denies undergoing thyroid function test. Has family history of thyroid.     PAST MEDICAL HISTORY:  Past Medical History:   Diagnosis Date   • Abnormal Pap smear of cervix     8/2021 LSGIL HPV+; 4/10/19 ASC-US +HPV; 7/30/18 ASC-H; 2017 ASCUS   • COVID-19 01/02/2021     MEDICATIONS:  Current Outpatient Medications   Medication Sig Dispense Refill   • Loratadine (Claritin) 10 MG Chew Tab Chew 10 mg by mouth daily. May take 0.5 tablet if having significant dryness 30 tablet 0   • levonorgestrel-ethinyl estradiol (LESSINA-28) 0.1-20 MG-MCG per tablet Take 1 tablet by mouth daily. 84 tablet 3     No current facility-administered medications for this visit.     ALLERGIES:  Allergies as of 05/23/2023 - Reviewed 05/23/2023   Allergen Reaction Noted   • Ibuprofen SHORTNESS OF BREATH 05/19/2017   • Shellfish allergy   (food or med) Other (See Comments) 07/08/2019     FAMILY HISTORY:  Family History   Problem Relation Age of Onset   • Clotting Disorder Mother         Reynaulds, Scleroderma & APS   • Autoimmune Disease Mother         Scleaderma, APS   • Thyroid Mother    • Diabetes Father         Type 2   • Thyroid Sister    • Diabetes Maternal Grandmother    • Myocardial Infarction Maternal Grandfather    • Dementia/Alzheimers Paternal Grandmother    • Sudden Death Paternal Grandfather         MVA ran over by a [employer] truck @ Work   • Systemic Lupus Erythematosus Maternal Aunt         Clotting Disorder     SOCIAL HISTORY:  Social History     Tobacco Use   • Smoking status: Never   • Smokeless tobacco: Never   Vaping Use   • Vaping Use: never used   Substance Use Topics   • Alcohol use: Yes     Comment: Occas.    • Drug use: Never     Past Surgical HISTORY  Past Surgical History:   Procedure Laterality Date   • Colposcopy bx cervix endocerv curr  09/19/2018    @ [facility]; Negative for dysplasia, glandular atypia and malignancy   • Colposcopy cervix & upper / adjacent vagina  09/24/2021    Cx Bx at 12:00 = CIN1, ECC= can't  rule out HGSIL   • Colposcopy cervix & upper / adjacent vagina  2023   • Colposcopy vag w/cerv w/bio N/A 04/10/2019    [facility]   • Colposcopy,loop electrd cervix excis  09/2022       REVIEW OF SYSTEMS:  Constitutional: Generally feels well, no fevers, no chills  EYES: No Visual complaints  HEENT: No nasal congestion, No sore throat  CARDIOVASCULAR: No exertional chest pain or pressure, No palpitations, No dyspnea (shortness of breath) on exertion, No lower extremity edema  RESPIRATORY: No cough  GASTROINTESTINAL: No heartburn, No abdominal pain, No diarrhea, No constipation, No blood in stool  GENITOURINARY: No hematuria, No dysuria  GYN: No abnormal vaginal discharge, No spotting  MUSCULOSKELETAL: No joint pain  NEUROLOGIC: No headaches, No dizziness  INTEGUMENT: No rashes, No concerning skin lesions  ENDOCRINE: No polydipsia (excessive thirst), No polyphagia (excessive hunger), No polyuria (excessive urination)  HEMATOLOGY: No bruising, No bleeding  ALLERGY: reports hay fever/seasonal allergies  PSYCHIATRIC: Sleeping well, denies depression, reports anxiety, but is manageable.    OBJECTIVE:  Visit Vitals  /72 (BP Location: RUE - Right upper extremity, Patient Position: Sitting, Cuff Size: Large Adult)   Pulse 94   Ht 6'   Wt 75.9 kg (167 lb 6.4 oz)   LMP 03/28/2023 (Approximate)   SpO2 99%   BMI 22.70 kg/m²       PHYSICAL EXAM:  Constitutional: Alert, in no acute distress and current vital signs reviewed.  Head and Face: Atraumatic, no deformities, normocephalic, normal facies.  Eyes: No discharge, normal conjunctiva, no eyelid swelling, no ptosis and the sclerae were normal. Pupils equal, round and reactive to light and accommodation, conjugate gaze and extraocular movements were intact.  ENT: Normal appearing outer ear, normal appearing nose. Examination of the tympanic membrane showed normal landmarks, normal appearing external canal.  Neck: Normal appearing neck, supple neck and no mass was seen. Thyroid  not enlarged and no thyroid nodules.  Lymphatic: No lymphadenopathy.  Pulmonary: No respiratory distress, normal respiratory rate and effort and no accessory muscle use. Breath sounds clear to auscultation bilaterally.  Cardiovascular: Normal rate, no murmurs were heard, regular rhythm, normal S1 and normal S2. Edema was not present in the lower extremities.  Abdomen: Soft, nontender, nondistended, No abdominal mass. No hepatomegaly.  Musculoskeletal: Normal gait. No musculoskeletal erythema was seen, no joint swelling seen, and no joint tenderness was elicited.  Neurologic: No sensory deficits noted. No coordination deficits. Normal gait.  Psychiatric: Oriented to person, oriented to place and oriented to time. Alert and awake, interactive and mood/affect were appropriate. Judgment not impaired. Normal attention span. Short term memory intact.  Skin, Hair, Nails: Normal skin color, and pigmentation and no rash.    DIAGNOSTIC STUDIES:  LAB RESULTS:  Lab Services on 05/19/2023   Component Date Value Ref Range Status   • Fasting Status 05/19/2023 13  0 - 999 Hours Final   • Sodium 05/19/2023 140  135 - 145 mmol/L Final   • Potassium 05/19/2023 4.2  3.4 - 5.1 mmol/L Final   • Chloride 05/19/2023 108  97 - 110 mmol/L Final   • Carbon Dioxide 05/19/2023 26  21 - 32 mmol/L Final   • Anion Gap 05/19/2023 10  7 - 19 mmol/L Final   • Glucose 05/19/2023 84  70 - 99 mg/dL Final   • BUN 05/19/2023 15  6 - 20 mg/dL Final   • Creatinine 05/19/2023 0.86  0.51 - 0.95 mg/dL Final   • Glomerular Filtration Rate 05/19/2023 >90  >=60 Final   • BUN/Cr 05/19/2023 17  7 - 25 Final   • Calcium 05/19/2023 8.7  8.4 - 10.2 mg/dL Final   • Bilirubin, Total 05/19/2023 1.3 (H)  0.2 - 1.0 mg/dL Final   • GOT/AST 05/19/2023 16  <=37 Units/L Final   • GPT/ALT 05/19/2023 19  <64 Units/L Final   • Alkaline Phosphatase 05/19/2023 42 (L)  45 - 117 Units/L Final   • Albumin 05/19/2023 4.1  3.6 - 5.1 g/dL Final   • Protein, Total 05/19/2023 7.1  6.4 -  8.2 g/dL Final   • Globulin 05/19/2023 3.0  2.0 - 4.0 g/dL Final   • A/G Ratio 05/19/2023 1.4  1.0 - 2.4 Final   • Hemoglobin A1C 05/19/2023 5.3  4.5 - 5.6 % Final   • Cholesterol 05/19/2023 256 (H)  <=199 mg/dL Final   • Triglycerides 05/19/2023 84  <=149 mg/dL Final   • HDL 05/19/2023 83  >=50 mg/dL Final   • LDL 05/19/2023 156 (H)  <=129 mg/dL Final   • Non-HDL Cholesterol 05/19/2023 173  mg/dL Final   • Cholesterol/ HDL Ratio 05/19/2023 3.1  <=4.4 Final   • TSH 05/19/2023 2.970  0.350 - 5.000 mcUnits/mL Final       ASSESSMENT AND PLAN:  This 29 year old female presents with :  Routine physical examination  Diet and exercise:  Recommended dietary and lifestyle modifications.  Encouraged to exercise regularly.    Immunization:   Recommenced COVID-19 as willing.   Not recommended pneumonia vaccinations.    Screening Labs:   Reviewed and discussed previous reports.  Advised checking LDL next year and to be monitored.  Educated if we give medications for controlling cholesterol, needed to check for any plan for having kids.  Will look at the results of cholesterol panel done through work.   Educated can drink water and a couple of cups of black coffee before fasting labs.    Seasonal allergies:  Continue current management.    Family history of thyroid disease:  - Thyroid Stimulating Hormone; Future.    Will send thyroid results.  Follow up in one year with work labs or sooner if needed.    Orders Placed This Encounter   • Thyroid Stimulating Hormone     Refer to orders.  Medical compliance with plan discussed and risks of non-compliance reviewed.  Patient education completed on disease process, etiology & prognosis.  Proper usage and side effects of medications reviewed & discussed.  Patient understands and agrees with the plan.  Return to clinic as clinically indicated as discussed with the patient who verbalized understanding of the plan and is in agreement with the plan.    I,  Dr. Tiffanie Stephen, have created a  visit summary document based on the audio recording between BARBIE Rees and this patient for the physician to review, edit as needed, and authenticate.    Creation Date: 5/24/2023     I have reviewed and edited the visit summary above and attest that it is accurate.  Janeen Sommers DNP, BARBIE       No

## 2023-08-11 ENCOUNTER — NON-APPOINTMENT (OUTPATIENT)
Age: 43
End: 2023-08-11

## 2023-08-31 ENCOUNTER — APPOINTMENT (OUTPATIENT)
Dept: ULTRASOUND IMAGING | Facility: CLINIC | Age: 43
End: 2023-08-31
Payer: COMMERCIAL

## 2023-08-31 ENCOUNTER — OUTPATIENT (OUTPATIENT)
Dept: OUTPATIENT SERVICES | Facility: HOSPITAL | Age: 43
LOS: 1 days | End: 2023-08-31
Payer: COMMERCIAL

## 2023-08-31 ENCOUNTER — APPOINTMENT (OUTPATIENT)
Dept: MAMMOGRAPHY | Facility: CLINIC | Age: 43
End: 2023-08-31
Payer: COMMERCIAL

## 2023-08-31 DIAGNOSIS — Z98.890 OTHER SPECIFIED POSTPROCEDURAL STATES: Chronic | ICD-10-CM

## 2023-08-31 DIAGNOSIS — Z00.8 ENCOUNTER FOR OTHER GENERAL EXAMINATION: ICD-10-CM

## 2023-08-31 DIAGNOSIS — Z98.891 HISTORY OF UTERINE SCAR FROM PREVIOUS SURGERY: Chronic | ICD-10-CM

## 2023-08-31 PROCEDURE — G0279: CPT | Mod: 26

## 2023-08-31 PROCEDURE — 77066 DX MAMMO INCL CAD BI: CPT | Mod: 26

## 2023-08-31 PROCEDURE — G0279: CPT

## 2023-08-31 PROCEDURE — 77066 DX MAMMO INCL CAD BI: CPT

## 2023-12-04 ENCOUNTER — NON-APPOINTMENT (OUTPATIENT)
Age: 43
End: 2023-12-04

## 2024-02-17 ENCOUNTER — APPOINTMENT (OUTPATIENT)
Dept: ULTRASOUND IMAGING | Facility: CLINIC | Age: 44
End: 2024-02-17
Payer: COMMERCIAL

## 2024-02-17 ENCOUNTER — APPOINTMENT (OUTPATIENT)
Dept: MAMMOGRAPHY | Facility: CLINIC | Age: 44
End: 2024-02-17
Payer: COMMERCIAL

## 2024-02-17 ENCOUNTER — OUTPATIENT (OUTPATIENT)
Dept: OUTPATIENT SERVICES | Facility: HOSPITAL | Age: 44
LOS: 1 days | End: 2024-02-17
Payer: COMMERCIAL

## 2024-02-17 DIAGNOSIS — Z98.891 HISTORY OF UTERINE SCAR FROM PREVIOUS SURGERY: Chronic | ICD-10-CM

## 2024-02-17 DIAGNOSIS — Z98.890 OTHER SPECIFIED POSTPROCEDURAL STATES: Chronic | ICD-10-CM

## 2024-02-17 DIAGNOSIS — R92.8 OTHER ABNORMAL AND INCONCLUSIVE FINDINGS ON DIAGNOSTIC IMAGING OF BREAST: ICD-10-CM

## 2024-02-17 DIAGNOSIS — Z00.8 ENCOUNTER FOR OTHER GENERAL EXAMINATION: ICD-10-CM

## 2024-02-17 PROCEDURE — 76641 ULTRASOUND BREAST COMPLETE: CPT | Mod: 26,50

## 2024-02-17 PROCEDURE — 77066 DX MAMMO INCL CAD BI: CPT | Mod: 26

## 2024-02-17 PROCEDURE — 77062 BREAST TOMOSYNTHESIS BI: CPT | Mod: 26

## 2024-02-17 PROCEDURE — 76641 ULTRASOUND BREAST COMPLETE: CPT

## 2024-02-17 PROCEDURE — G0279: CPT | Mod: 26

## 2024-02-17 PROCEDURE — G0279: CPT

## 2024-02-17 PROCEDURE — 77066 DX MAMMO INCL CAD BI: CPT

## 2024-04-15 ENCOUNTER — APPOINTMENT (OUTPATIENT)
Dept: NEUROLOGY | Facility: CLINIC | Age: 44
End: 2024-04-15
Payer: COMMERCIAL

## 2024-04-15 ENCOUNTER — NON-APPOINTMENT (OUTPATIENT)
Age: 44
End: 2024-04-15

## 2024-04-15 VITALS
HEART RATE: 76 BPM | HEIGHT: 62 IN | BODY MASS INDEX: 20.24 KG/M2 | WEIGHT: 110 LBS | DIASTOLIC BLOOD PRESSURE: 75 MMHG | SYSTOLIC BLOOD PRESSURE: 117 MMHG

## 2024-04-15 DIAGNOSIS — G43.E09 CHRONIC MIGRAINE WITH AURA, NOT INTRACTABLE, WITHOUT STATUS MIGRAINOSUS: ICD-10-CM

## 2024-04-15 DIAGNOSIS — Z82.0 FAMILY HISTORY OF EPILEPSY AND OTHER DISEASES OF THE NERVOUS SYSTEM: ICD-10-CM

## 2024-04-15 DIAGNOSIS — G43.709 CHRONIC MIGRAINE W/OUT AURA, NOT INTRACTABLE, W/OUT STATUS MIGRAINOSUS: ICD-10-CM

## 2024-04-15 DIAGNOSIS — Z78.9 OTHER SPECIFIED HEALTH STATUS: ICD-10-CM

## 2024-04-15 PROCEDURE — 99205 OFFICE O/P NEW HI 60 MIN: CPT

## 2024-04-15 PROCEDURE — G2211 COMPLEX E/M VISIT ADD ON: CPT

## 2024-04-15 RX ORDER — SUMATRIPTAN 25 MG/1
25 TABLET, FILM COATED ORAL
Qty: 1 | Refills: 5 | Status: ACTIVE | COMMUNITY
Start: 2024-04-15 | End: 1900-01-01

## 2024-04-15 NOTE — DISCUSSION/SUMMARY
[FreeTextEntry1] : 43 W who is here for initial consultation of migraine w visual aura. the frequency is not high enough to warrant ppx medication. Will try imitrex prn. SE were discussed with the patient in detail. She declined mri brain given new symptom of tingling in her face. will have her continue to monitor and fu in 4 months.  I spent the time noted on the day of this patient encounter preparing for, review of medical records,review of pertinent diagnostic studies, providing and documenting the above E/M service and counseling and educate patient on differential, workup, disease course, and treatment/management. Education was provided to the patient during this encounter. All questions and concerns were answered and addressed in detail. The patient verbalized understanding and agreed to plan. Patient was advised to continue to monitor for neurologic symptoms and to notify my office or go to the nearest emergency room if there are any changes. Any orders/referrals and communications were provided as well. Side effects of the above medications were discussed in detail including but not limited to applicable black box warning and teratogenicity as appropriate. Patient was advised to bring previous records to my office.

## 2024-04-15 NOTE — HISTORY OF PRESENT ILLNESS
[FreeTextEntry1] : 43 W who is here for initial consultation of migraine w aura. She has been tracking the migraines and it seems she has them around her menses. She thinks she may be in perimenopause. The last migraine was last month during her last day of her period.  location over left eye qual throbbing aura with right eye seeing watery images  associated with nausea. no phonophobia, no photophobia. no nausea. New symptom of tingling in the face.  She will feel foggy for 24 hrs afterwards tried advil or tylenol. She finds that drinking caffeine helps prevent them. She was offered but declined mri brain given new symptom of tingling in the face. She wanted to have a rescue medication. Imitrex was reviewed with the pt.

## 2024-04-15 NOTE — PHYSICAL EXAM
[General Appearance - Alert] : alert [Oriented To Time, Place, And Person] : oriented to person, place, and time [Person] : oriented to person [Place] : oriented to place [Time] : oriented to time [Short Term Intact] : short term memory intact [Fluency] : fluency intact [Current Events] : adequate knowledge of current events [Cranial Nerves Optic (II)] : visual acuity intact bilaterally,  visual fields full to confrontation, pupils equal round and reactive to light [Cranial Nerves Oculomotor (III)] : extraocular motion intact [Cranial Nerves Trigeminal (V)] : facial sensation intact symmetrically [Cranial Nerves Facial (VII)] : face symmetrical [Cranial Nerves Vestibulocochlear (VIII)] : hearing was intact bilaterally [Cranial Nerves Accessory (XI - Cranial And Spinal)] : head turning and shoulder shrug symmetric [Cranial Nerves Hypoglossal (XII)] : there was no tongue deviation with protrusion [Motor Tone] : muscle tone was normal in all four extremities [Motor Strength] : muscle strength was normal in all four extremities [Sensation Tactile Decrease] : light touch was intact [Coordination - Dysmetria Impaired Finger-to-Nose Bilateral] : not present [Abnormal Walk] : normal gait [1+] : Patella left 1+

## 2024-12-03 ENCOUNTER — NON-APPOINTMENT (OUTPATIENT)
Age: 44
End: 2024-12-03